# Patient Record
Sex: FEMALE | Race: WHITE | NOT HISPANIC OR LATINO | Employment: UNEMPLOYED | URBAN - NONMETROPOLITAN AREA
[De-identification: names, ages, dates, MRNs, and addresses within clinical notes are randomized per-mention and may not be internally consistent; named-entity substitution may affect disease eponyms.]

---

## 2017-04-04 ENCOUNTER — TRANSCRIBE ORDERS (OUTPATIENT)
Dept: ADMINISTRATIVE | Facility: HOSPITAL | Age: 82
End: 2017-04-04

## 2017-04-04 DIAGNOSIS — Z12.31 VISIT FOR SCREENING MAMMOGRAM: Primary | ICD-10-CM

## 2017-04-11 ENCOUNTER — HOSPITAL ENCOUNTER (OUTPATIENT)
Dept: MAMMOGRAPHY | Facility: HOSPITAL | Age: 82
Discharge: HOME | End: 2017-04-11
Attending: INTERNAL MEDICINE | Admitting: INTERNAL MEDICINE

## 2017-04-11 DIAGNOSIS — Z12.31 VISIT FOR SCREENING MAMMOGRAM: ICD-10-CM

## 2017-04-11 PROCEDURE — 77063 BREAST TOMOSYNTHESIS BI: CPT

## 2017-04-11 PROCEDURE — 77063 BREAST TOMOSYNTHESIS BI: CPT | Performed by: RADIOLOGY

## 2017-04-11 PROCEDURE — G0202 SCR MAMMO BI INCL CAD: HCPCS | Performed by: RADIOLOGY

## 2017-04-11 PROCEDURE — G0202 SCR MAMMO BI INCL CAD: HCPCS

## 2018-03-20 ENCOUNTER — APPOINTMENT (OUTPATIENT)
Dept: CT IMAGING | Facility: HOSPITAL | Age: 83
End: 2018-03-20

## 2018-03-20 ENCOUNTER — HOSPITAL ENCOUNTER (EMERGENCY)
Facility: HOSPITAL | Age: 83
Discharge: HOME | End: 2018-03-20
Attending: EMERGENCY MEDICINE | Admitting: EMERGENCY MEDICINE

## 2018-03-20 ENCOUNTER — APPOINTMENT (OUTPATIENT)
Dept: GENERAL RADIOLOGY | Facility: HOSPITAL | Age: 83
End: 2018-03-20

## 2018-03-20 VITALS
OXYGEN SATURATION: 99 % | BODY MASS INDEX: 26.24 KG/M2 | DIASTOLIC BLOOD PRESSURE: 79 MMHG | RESPIRATION RATE: 18 BRPM | SYSTOLIC BLOOD PRESSURE: 129 MMHG | HEIGHT: 61 IN | WEIGHT: 139 LBS | TEMPERATURE: 98.2 F | HEART RATE: 78 BPM

## 2018-03-20 DIAGNOSIS — S42.224A CLOSED 2-PART NONDISPLACED FRACTURE OF SURGICAL NECK OF RIGHT HUMERUS, INITIAL ENCOUNTER: Primary | ICD-10-CM

## 2018-03-20 DIAGNOSIS — T14.8XXA SKIN ABRASION: ICD-10-CM

## 2018-03-20 PROCEDURE — 99283 EMERGENCY DEPT VISIT LOW MDM: CPT

## 2018-03-20 PROCEDURE — 73030 X-RAY EXAM OF SHOULDER: CPT

## 2018-03-20 PROCEDURE — 73200 CT UPPER EXTREMITY W/O DYE: CPT | Performed by: RADIOLOGY

## 2018-03-20 PROCEDURE — 73200 CT UPPER EXTREMITY W/O DYE: CPT

## 2018-03-20 PROCEDURE — 73030 X-RAY EXAM OF SHOULDER: CPT | Performed by: RADIOLOGY

## 2018-03-20 RX ORDER — HYDROCODONE BITARTRATE AND ACETAMINOPHEN 5; 325 MG/1; MG/1
1 TABLET ORAL ONCE
Status: COMPLETED | OUTPATIENT
Start: 2018-03-20 | End: 2018-03-20

## 2018-03-20 RX ORDER — HYDROCHLOROTHIAZIDE 25 MG/1
25 TABLET ORAL DAILY
COMMUNITY

## 2018-03-20 RX ORDER — LEVOTHYROXINE SODIUM 0.03 MG/1
25 TABLET ORAL DAILY
COMMUNITY

## 2018-03-20 RX ORDER — LISINOPRIL 40 MG/1
40 TABLET ORAL DAILY
COMMUNITY

## 2018-03-20 RX ORDER — ASPIRIN 325 MG
325 TABLET, DELAYED RELEASE (ENTERIC COATED) ORAL EVERY 6 HOURS PRN
COMMUNITY

## 2018-03-20 RX ORDER — BACITRACIN ZINC 500 [USP'U]/G
OINTMENT TOPICAL ONCE
Status: COMPLETED | OUTPATIENT
Start: 2018-03-20 | End: 2018-03-20

## 2018-03-20 RX ORDER — HYDROCODONE BITARTRATE AND ACETAMINOPHEN 7.5; 325 MG/1; MG/1
1 TABLET ORAL EVERY 8 HOURS PRN
Qty: 9 TABLET | Refills: 0 | Status: SHIPPED | OUTPATIENT
Start: 2018-03-20 | End: 2018-07-26

## 2018-03-20 RX ADMIN — BACITRACIN ZINC 1 EACH: 500 OINTMENT TOPICAL at 18:00

## 2018-03-20 RX ADMIN — HYDROCODONE BITARTRATE AND ACETAMINOPHEN 1 TABLET: 5; 325 TABLET ORAL at 22:16

## 2018-03-20 NOTE — ED PROVIDER NOTES
Subjective     History provided by:  Patient   used: No    Fall   Mechanism of injury: fall    Time since incident:  4 hours  Fall:     Fall occurred:  From a ladder    Impact surface:  Hard floor    Point of impact: right shoulder.    Entrapped after fall: no    Suspicion of alcohol use: no    Suspicion of drug use: no    Prior to arrival data:     Patient ambulatory at scene: yes      Responsiveness at scene:  Alert    Orientation at scene:  Person, place, situation and time    Loss of consciousness: no      Amnesic to event: no      Immobilization:  None  Associated symptoms: no abdominal pain, no back pain, no chest pain, no headaches and no neck pain    Risk factors: diabetes    Risk factors: no COPD        Review of Systems   Constitutional: Negative.  Negative for fever.   HENT: Negative.    Respiratory: Negative.    Cardiovascular: Negative.  Negative for chest pain.   Gastrointestinal: Negative.  Negative for abdominal pain.   Endocrine: Negative.    Genitourinary: Negative.  Negative for dysuria.   Musculoskeletal: Negative for back pain and neck pain.        (+) right shoulder pain   Skin: Positive for wound.   Neurological: Negative.  Negative for headaches.   Psychiatric/Behavioral: Negative.    All other systems reviewed and are negative.      Past Medical History:   Diagnosis Date   • Diabetes mellitus    • Disease of thyroid gland    • Hyperlipidemia        Allergies   Allergen Reactions   • Morphine And Related Rash       Past Surgical History:   Procedure Laterality Date   • BREAST BIOPSY Left     benign   • HYSTERECTOMY         Family History   Problem Relation Age of Onset   • Breast cancer Neg Hx        Social History     Social History   • Marital status:      Social History Main Topics   • Smoking status: Never Smoker   • Alcohol use No   • Drug use: No     Other Topics Concern   • Not on file           Objective   Physical Exam   Constitutional: She is oriented to  person, place, and time. She appears well-developed and well-nourished. No distress.   HENT:   Head: Normocephalic and atraumatic.   Right Ear: External ear normal.   Left Ear: External ear normal.   Nose: Nose normal.   Eyes: Conjunctivae and EOM are normal. Pupils are equal, round, and reactive to light.   Neck: Normal range of motion. Neck supple. No JVD present. No tracheal deviation present.   Cardiovascular: Normal rate, regular rhythm and normal heart sounds.    No murmur heard.  Pulmonary/Chest: Effort normal and breath sounds normal. No respiratory distress. She has no wheezes.   Abdominal: Soft. Bowel sounds are normal. There is no tenderness.   Musculoskeletal: She exhibits no edema or deformity.        Right shoulder: She exhibits decreased range of motion, tenderness and pain.   Right shoulder - no active ROM. Can move passively to 90 degrees with pain elicited.   Neurological: She is alert and oriented to person, place, and time. No cranial nerve deficit.   Skin: Skin is warm and dry. Abrasion noted. No rash noted. She is not diaphoretic. No erythema. No pallor.        Psychiatric: She has a normal mood and affect. Her behavior is normal. Thought content normal.   Nursing note and vitals reviewed.      Splint - Cast - Strapping  Date/Time: 3/20/2018 10:08 PM  Performed by: GRISEL GREENBERG  Authorized by: VENKATESH LEDBETTER     Consent:     Consent obtained:  Verbal    Consent given by:  Patient    Risks discussed:  Discoloration, numbness, pain and swelling    Alternatives discussed:  No treatment and delayed treatment  Universal protocol:     Procedure explained and questions answered to patient or proxy's satisfaction: yes      Relevant documents present and verified: yes      Test results available and properly labeled: yes      Imaging studies available: yes      Required blood products, implants, devices, and special equipment available: yes      Site/side marked: yes      Immediately prior  to procedure a time out was called: yes      Patient identity confirmed:  Verbally with patient, arm band, provided demographic data and hospital-assigned identification number  Pre-procedure details:     Sensation:  Normal    Skin color:  Warm, pink  Procedure details:     Laterality:  Right    Location:  Shoulder    Shoulder:  R shoulder    Strapping: no      Supplies:  Sling  Post-procedure details:     Pain:  Unchanged    Sensation:  Normal    Skin color:  Warm, pink    Patient tolerance of procedure:  Tolerated well, no immediate complications  Comments:      Tech applied splint in my presence. Pt tolerated sling application well. No acute complications. Neurovascularly intact.               ED Course  ED Course   Comment By Time   Awaiting CT results. Jorge MELTON called CT and they state report reading still in process. Lilia Brooks PA-C 03/20 2052   Spoke with Dr. Zimmerman recommends sling and ortho follow up in his office. Lilia Brooks PA-C 03/20 2153   Discussed findings and recommendation from Dr. Zimmerman with patient and family. Patient and family is requesting to see Dr. Madison. Will give patient both orthopedics info for follow up as an outpatient. Lilia Brooks PA-C 03/20 2206                  MDM  Number of Diagnoses or Management Options  Closed 2-part nondisplaced fracture of surgical neck of right humerus, initial encounter: new and requires workup  Skin abrasion:      Amount and/or Complexity of Data Reviewed  Tests in the radiology section of CPT®: reviewed and ordered  Discuss the patient with other providers: yes  Independent visualization of images, tracings, or specimens: yes    Risk of Complications, Morbidity, and/or Mortality  Presenting problems: moderate  Diagnostic procedures: moderate  Management options: moderate    Patient Progress  Patient progress: stable      Final diagnoses:   Closed 2-part nondisplaced fracture of surgical neck of right humerus, initial encounter    Skin abrasion            Lilia Brooks PA-C  03/20/18 0208

## 2018-03-20 NOTE — ED NOTES
Pt had unwitnessed fall last night while with her caregiver (sister). Pt was in the floor and couldn't get up for a while. Eventually she got up to her walker and went to bed. She woke this morning in pain, and has hurt very bad all day. It appears she is tender in right neck, Has painful rom with right shoulder, elbow, and also pain in the right hip.     Jorge Douglass RN  03/20/18 2984

## 2018-03-22 ENCOUNTER — OFFICE VISIT (OUTPATIENT)
Dept: ORTHOPEDIC SURGERY | Facility: CLINIC | Age: 83
End: 2018-03-22

## 2018-03-22 VITALS — BODY MASS INDEX: 26.24 KG/M2 | HEIGHT: 61 IN | WEIGHT: 139 LBS

## 2018-03-22 DIAGNOSIS — S42.201A CLOSED FRACTURE OF PROXIMAL END OF RIGHT HUMERUS, UNSPECIFIED FRACTURE MORPHOLOGY, INITIAL ENCOUNTER: Primary | ICD-10-CM

## 2018-03-22 PROCEDURE — 23600 CLTX PROX HUMRL FX W/O MNPJ: CPT | Performed by: ORTHOPAEDIC SURGERY

## 2018-03-22 RX ORDER — ROSUVASTATIN CALCIUM 10 MG/1
10 TABLET, COATED ORAL DAILY
COMMUNITY

## 2018-03-22 RX ORDER — ALLOPURINOL 300 MG/1
300 TABLET ORAL DAILY
COMMUNITY

## 2018-03-22 NOTE — PROGRESS NOTES
"Patient: Raven Ramirez    YOB: 1930        History of Present Illness: Pleasant relatively healthy 87-year-old white female who in Tuesday fell off a step stool injuring her right shoulder.  Complain of some pain is stiffness and bruising.  Denies any paresthesias or swelling of the hand.  Was seen in emergency room and placed in a immobilizer and presents here for follow-up.  Denies any other injury    Past Medical History:   Diagnosis Date   • Diabetes mellitus    • Disease of thyroid gland    • Hyperlipidemia         Social History     Social History   • Marital status:      Spouse name: N/A   • Number of children: N/A   • Years of education: N/A     Occupational History   • Not on file.     Social History Main Topics   • Smoking status: Never Smoker   • Smokeless tobacco: Not on file   • Alcohol use No   • Drug use: No   • Sexual activity: Not on file     Other Topics Concern   • Not on file     Social History Narrative   • No narrative on file           Physical Exam: 87 y.o. female  General Appearance:    Alert and oriented x 3, cooperative, in no acute distress                   Vitals:    03/22/18 1417   Weight: 63 kg (139 lb)   Height: 154.9 cm (61\")          Right shoulder skin is intact.  There is moderate swelling and early bruising and ecchymosis noted in her right upper arm.  No obvious deformities noted.  The right hand grossly neurovascular intact without swelling      Radiology:     X-rays CT scan were done shows what appears to be a 2 part proximal humerus fracture with slight displacement impacted is about 40° of angulation        Assessment/Plan: Right proximal humerus fracture.  Had a long discussion with the patient and the daughter about the options of operative versus nonoperative treatment here.  I told her she could probably go either way.  Our decision was to proceed with nonoperative treatment at this point.  Main risk being some stiffness and decreased function " of the shoulder, not healing etc.  She is to continue the immobilizer at all times.  She can remove it briefly for bathing purposes only.  We'll see her back in approximately 1 week for follow-up x-ray and check.  Discussed the possibility of further movement of the fracture and need for surgery at that point.           Discussed the patient's BMI with her. BMI is within normal parameters. No follow-up required.      Discussion/Summary:        This chart was completed utilizing the dragon speech recognition software.  Grammatical errors, random word insertions, pronoun errors, and incomplete sentences or occasional consequences of the system due to software limitations, ambient noise, and hardware issues.  Any questions or concerns about the content, text, or information contained within the body of this dictation should be directly addressed to the physician for clarification        This document was signed by Ramin Garcia M.D. March 22, 2018 3:02 PM

## 2018-03-28 DIAGNOSIS — S42.294D OTHER CLOSED NONDISPLACED FRACTURE OF PROXIMAL END OF RIGHT HUMERUS WITH ROUTINE HEALING, SUBSEQUENT ENCOUNTER: Primary | ICD-10-CM

## 2018-03-29 ENCOUNTER — OFFICE VISIT (OUTPATIENT)
Dept: ORTHOPEDIC SURGERY | Facility: CLINIC | Age: 83
End: 2018-03-29

## 2018-03-29 ENCOUNTER — HOSPITAL ENCOUNTER (OUTPATIENT)
Dept: GENERAL RADIOLOGY | Facility: HOSPITAL | Age: 83
Discharge: HOME | End: 2018-03-29
Attending: ORTHOPAEDIC SURGERY | Admitting: ORTHOPAEDIC SURGERY

## 2018-03-29 VITALS — HEIGHT: 61 IN | WEIGHT: 138.89 LBS | BODY MASS INDEX: 26.22 KG/M2

## 2018-03-29 DIAGNOSIS — S42.294D OTHER CLOSED NONDISPLACED FRACTURE OF PROXIMAL END OF RIGHT HUMERUS WITH ROUTINE HEALING, SUBSEQUENT ENCOUNTER: Primary | ICD-10-CM

## 2018-03-29 DIAGNOSIS — S42.294D OTHER CLOSED NONDISPLACED FRACTURE OF PROXIMAL END OF RIGHT HUMERUS WITH ROUTINE HEALING, SUBSEQUENT ENCOUNTER: ICD-10-CM

## 2018-03-29 PROBLEM — S42.201D CLOSED FRACTURE OF PROXIMAL END OF RIGHT HUMERUS WITH ROUTINE HEALING: Status: ACTIVE | Noted: 2018-03-22

## 2018-03-29 PROCEDURE — 73030 X-RAY EXAM OF SHOULDER: CPT | Performed by: RADIOLOGY

## 2018-03-29 PROCEDURE — 73030 X-RAY EXAM OF SHOULDER: CPT

## 2018-03-29 PROCEDURE — 99024 POSTOP FOLLOW-UP VISIT: CPT | Performed by: ORTHOPAEDIC SURGERY

## 2018-03-29 NOTE — PROGRESS NOTES
"Patient: Raven Ramirez    YOB: 1930        History of Present Illness: Patient presents for follow-up x-ray of the right shoulder.  She has a comminuted impacted slightly angulated proximal humerus fracture.  No new complaints today.  No specific paresthesias    Past Medical History:   Diagnosis Date   • Diabetes mellitus    • Disease of thyroid gland    • Hyperlipidemia         Social History     Social History   • Marital status:      Spouse name: N/A   • Number of children: N/A   • Years of education: N/A     Occupational History   • Not on file.     Social History Main Topics   • Smoking status: Never Smoker   • Smokeless tobacco: Not on file   • Alcohol use No   • Drug use: No   • Sexual activity: Not on file     Other Topics Concern   • Not on file     Social History Narrative   • No narrative on file           Physical Exam: 87 y.o. female  General Appearance:    Alert and oriented x 3, cooperative, in no acute distress                   Vitals:    03/29/18 1047   Weight: 63 kg (138 lb 14.2 oz)   Height: 154.9 cm (60.98\")          Skin is intact.  There is resolving ecchymosis noted in her arm.  Her right hand is grossly neurovascular intact with minimal swelling      Radiology:   X-rays done today again show a proximal humerus fracture impacted somewhat angulated and comminuted but really no significant change from last week.          Assessment/Plan: Continue the shoulder immobilizer at all times.  She can remove it daily for bathing.  Told her that she can begin pendulum exercises very mildly as long as there is no significant pain or popping or catching.  Have discussed today with the patient again and her son operative versus nonoperative treatment and we have made a decision to treat this nonoperatively.  We'll see her back in approximate 4 weeks for follow-up x-ray and check            Discussion/Summary:        This chart was completed utilizing the dragon speech recognition " software.  Grammatical errors, random word insertions, pronoun errors, and incomplete sentences or occasional consequences of the system due to software limitations, ambient noise, and hardware issues.  Any questions or concerns about the content, text, or information contained within the body of this dictation should be directly addressed to the physician for clarification        This document was signed by Ramin Garcia M.D. March 29, 2018 10:58 AM

## 2018-04-17 ENCOUNTER — OFFICE VISIT (OUTPATIENT)
Dept: ORTHOPEDIC SURGERY | Facility: CLINIC | Age: 83
End: 2018-04-17

## 2018-04-17 ENCOUNTER — HOSPITAL ENCOUNTER (OUTPATIENT)
Dept: GENERAL RADIOLOGY | Facility: HOSPITAL | Age: 83
Discharge: HOME | End: 2018-04-17
Attending: ORTHOPAEDIC SURGERY | Admitting: ORTHOPAEDIC SURGERY

## 2018-04-17 DIAGNOSIS — S42.294D OTHER CLOSED NONDISPLACED FRACTURE OF PROXIMAL END OF RIGHT HUMERUS WITH ROUTINE HEALING, SUBSEQUENT ENCOUNTER: ICD-10-CM

## 2018-04-17 DIAGNOSIS — M25.511 RIGHT SHOULDER PAIN, UNSPECIFIED CHRONICITY: Primary | ICD-10-CM

## 2018-04-17 PROCEDURE — 73030 X-RAY EXAM OF SHOULDER: CPT | Performed by: RADIOLOGY

## 2018-04-17 PROCEDURE — 99024 POSTOP FOLLOW-UP VISIT: CPT | Performed by: ORTHOPAEDIC SURGERY

## 2018-04-17 PROCEDURE — 73030 X-RAY EXAM OF SHOULDER: CPT

## 2018-04-17 NOTE — PROGRESS NOTES
Patient: Raven Ramirez    YOB: 1930        History of Present Illness: Patient presents back today to have her right shoulder checked.  Apparently was trying to do some pendulum exercises and got very painful for a couple days although it is gotten better now.    Past Medical History:   Diagnosis Date   • Diabetes mellitus    • Disease of thyroid gland    • Hyperlipidemia         Social History     Social History   • Marital status:      Spouse name: N/A   • Number of children: N/A   • Years of education: N/A     Occupational History   • Not on file.     Social History Main Topics   • Smoking status: Never Smoker   • Smokeless tobacco: Not on file   • Alcohol use No   • Drug use: No   • Sexual activity: Not on file     Other Topics Concern   • Not on file     Social History Narrative   • No narrative on file           Physical Exam: 87 y.o. female  General Appearance:    Alert and oriented x 3, cooperative, in no acute distress                 There were no vitals filed for this visit.         No obvious deformity or swelling or redness of both shoulders noted.  The right hand grossly neurovascularly intact without swelling.  I can do a gentle pendulum exercises without excruciating discomfort    Radiology:       X-rays done today show no change in alignment of the impacted 4 part proximal humerus fracture.      Assessment/Plan:  Healing right proximal humerus fracture.  Continue sling most of the time.  She can do gentle pendulum exercises to pain tolerance.  Do not let it become very painful.  She can also begin table walking exercises.  We'll see her back in about 2 and half weeks for follow-up x-ray and check may consider starting physical therapy at that point              Discussion/Summary:        This chart was completed utilizing the dragon speech recognition software.  Grammatical errors, random word insertions, pronoun errors, and incomplete sentences or occasional consequences of  the system due to software limitations, ambient noise, and hardware issues.  Any questions or concerns about the content, text, or information contained within the body of this dictation should be directly addressed to the physician for clarification        This document was signed by Ramin Garcia M.D. April 17, 2018 8:57 AM

## 2018-05-02 DIAGNOSIS — S42.291D CLOSED 4-PART FRACTURE OF PROXIMAL END OF RIGHT HUMERUS WITH ROUTINE HEALING, SUBSEQUENT ENCOUNTER: Primary | ICD-10-CM

## 2018-05-03 ENCOUNTER — OFFICE VISIT (OUTPATIENT)
Dept: ORTHOPEDIC SURGERY | Facility: CLINIC | Age: 83
End: 2018-05-03

## 2018-05-03 ENCOUNTER — HOSPITAL ENCOUNTER (OUTPATIENT)
Dept: GENERAL RADIOLOGY | Facility: HOSPITAL | Age: 83
Discharge: HOME | End: 2018-05-03
Attending: ORTHOPAEDIC SURGERY | Admitting: ORTHOPAEDIC SURGERY

## 2018-05-03 VITALS — HEIGHT: 61 IN | BODY MASS INDEX: 26.43 KG/M2 | WEIGHT: 140 LBS

## 2018-05-03 DIAGNOSIS — S42.291D CLOSED 4-PART FRACTURE OF PROXIMAL END OF RIGHT HUMERUS WITH ROUTINE HEALING, SUBSEQUENT ENCOUNTER: ICD-10-CM

## 2018-05-03 DIAGNOSIS — S42.291D CLOSED 4-PART FRACTURE OF PROXIMAL END OF RIGHT HUMERUS WITH ROUTINE HEALING, SUBSEQUENT ENCOUNTER: Primary | ICD-10-CM

## 2018-05-03 PROBLEM — S42.293D: Status: ACTIVE | Noted: 2018-03-22

## 2018-05-03 PROCEDURE — 99024 POSTOP FOLLOW-UP VISIT: CPT | Performed by: ORTHOPAEDIC SURGERY

## 2018-05-03 PROCEDURE — 73030 X-RAY EXAM OF SHOULDER: CPT

## 2018-05-03 PROCEDURE — 73030 X-RAY EXAM OF SHOULDER: CPT | Performed by: RADIOLOGY

## 2018-06-13 DIAGNOSIS — M25.511 RIGHT SHOULDER PAIN, UNSPECIFIED CHRONICITY: Primary | ICD-10-CM

## 2018-06-14 ENCOUNTER — HOSPITAL ENCOUNTER (OUTPATIENT)
Dept: GENERAL RADIOLOGY | Facility: HOSPITAL | Age: 83
Discharge: HOME | End: 2018-06-14
Attending: ORTHOPAEDIC SURGERY | Admitting: ORTHOPAEDIC SURGERY

## 2018-06-14 ENCOUNTER — OFFICE VISIT (OUTPATIENT)
Dept: ORTHOPEDIC SURGERY | Facility: CLINIC | Age: 83
End: 2018-06-14

## 2018-06-14 VITALS — HEIGHT: 61 IN | BODY MASS INDEX: 26.43 KG/M2 | WEIGHT: 140 LBS

## 2018-06-14 DIAGNOSIS — M25.511 RIGHT SHOULDER PAIN, UNSPECIFIED CHRONICITY: ICD-10-CM

## 2018-06-14 DIAGNOSIS — S42.291D CLOSED 4-PART FRACTURE OF PROXIMAL END OF RIGHT HUMERUS WITH ROUTINE HEALING, SUBSEQUENT ENCOUNTER: Primary | ICD-10-CM

## 2018-06-14 PROCEDURE — 73030 X-RAY EXAM OF SHOULDER: CPT | Performed by: RADIOLOGY

## 2018-06-14 PROCEDURE — 99024 POSTOP FOLLOW-UP VISIT: CPT | Performed by: ORTHOPAEDIC SURGERY

## 2018-06-14 PROCEDURE — 73030 X-RAY EXAM OF SHOULDER: CPT

## 2018-06-14 NOTE — PROGRESS NOTES
"Patient: Raven Ramirez    YOB: 1930        History of Present Illness: Patient is proximally 1011 weeks status post right proximal humerus fracture comminuted with some displacement of the humeral head.  We chose to treat this nonoperatively and she's doing fine without major complaints today.  Feels that therapy is really helping her.  Denies any swelling or paresthesias of the right arm    Past Medical History:   Diagnosis Date   • Diabetes mellitus    • Disease of thyroid gland    • Hyperlipidemia         Social History     Social History   • Marital status:      Spouse name: N/A   • Number of children: N/A   • Years of education: N/A     Occupational History   • Not on file.     Social History Main Topics   • Smoking status: Never Smoker   • Smokeless tobacco: Not on file   • Alcohol use No   • Drug use: No   • Sexual activity: Not on file     Other Topics Concern   • Not on file     Social History Narrative   • No narrative on file           Physical Exam: 87 y.o. female  General Appearance:    Alert and oriented x 3, cooperative, in no acute distress                   Vitals:    06/14/18 1003   Weight: 63.5 kg (140 lb)   Height: 154.9 cm (61\")          On exam she has no obvious deformity or swelling or warmth the shoulder.  She can easily get her hand to her mouth, top of her head, and to her backside      Radiology:       X-rays show no change in alignment of the fracture some callus is noted      Assessment/Plan: Healing right proximal humerus fracture.  Patient clinically is doing quite well.  Would like to continue physical therapy for another month and she will be a little bit more motion as far as forward flexion and abduction.  We'll see her back for final visit in 6 weeks for x-ray and check          Discussion/Summary:                This chart was completed utilizing the dragon speech recognition software.  Grammatical errors, random word insertions, pronoun errors, and " incomplete sentences or occasional consequences of the system due to software limitations, ambient noise, and hardware issues.  Any questions or concerns about the content, text, or information contained within the body of this dictation should be directly addressed to the physician for clarification        This document was signed by Ramin Garcia M.D. June 14, 2018 10:18 AM

## 2018-07-25 DIAGNOSIS — S42.291D OTHER CLOSED DISPLACED FRACTURE OF PROXIMAL END OF RIGHT HUMERUS WITH ROUTINE HEALING, SUBSEQUENT ENCOUNTER: Primary | ICD-10-CM

## 2018-07-26 ENCOUNTER — HOSPITAL ENCOUNTER (OUTPATIENT)
Dept: GENERAL RADIOLOGY | Facility: HOSPITAL | Age: 83
Discharge: HOME | End: 2018-07-26
Attending: ORTHOPAEDIC SURGERY | Admitting: ORTHOPAEDIC SURGERY

## 2018-07-26 ENCOUNTER — OFFICE VISIT (OUTPATIENT)
Dept: ORTHOPEDIC SURGERY | Facility: CLINIC | Age: 83
End: 2018-07-26

## 2018-07-26 VITALS — WEIGHT: 140 LBS | BODY MASS INDEX: 26.43 KG/M2 | HEIGHT: 61 IN

## 2018-07-26 DIAGNOSIS — S42.291D OTHER CLOSED DISPLACED FRACTURE OF PROXIMAL END OF RIGHT HUMERUS WITH ROUTINE HEALING, SUBSEQUENT ENCOUNTER: Primary | ICD-10-CM

## 2018-07-26 DIAGNOSIS — S42.291D OTHER CLOSED DISPLACED FRACTURE OF PROXIMAL END OF RIGHT HUMERUS WITH ROUTINE HEALING, SUBSEQUENT ENCOUNTER: ICD-10-CM

## 2018-07-26 PROCEDURE — 73030 X-RAY EXAM OF SHOULDER: CPT

## 2018-07-26 PROCEDURE — 73030 X-RAY EXAM OF SHOULDER: CPT | Performed by: RADIOLOGY

## 2018-07-26 PROCEDURE — 99213 OFFICE O/P EST LOW 20 MIN: CPT | Performed by: ORTHOPAEDIC SURGERY

## 2018-07-26 RX ORDER — CHLORAL HYDRATE 500 MG
CAPSULE ORAL
COMMUNITY

## 2018-07-26 NOTE — PROGRESS NOTES
"Patient: Raven Ramirez    YOB: 1930        History of Present Illness: Patient is approximately 4 and half months status post a comminuted posterior displaced proximal humerus fracture which we treated nonoperatively.  Presents back today with no specific complaints.  States she has more range of motion of her shoulder.  No significant pain paresthesias or swelling    Past Medical History:   Diagnosis Date   • Diabetes mellitus (CMS/HCC)    • Disease of thyroid gland    • Hyperlipidemia    • Proximal humerus fracture         Social History     Social History   • Marital status:      Spouse name: N/A   • Number of children: N/A   • Years of education: N/A     Occupational History   • Not on file.     Social History Main Topics   • Smoking status: Never Smoker   • Smokeless tobacco: Never Used   • Alcohol use No   • Drug use: No   • Sexual activity: Not on file     Other Topics Concern   • Not on file     Social History Narrative   • No narrative on file           Physical Exam: 88 y.o. female  General Appearance:    Alert and oriented x 3, cooperative, in no acute distress                   Vitals:    07/26/18 1005   Weight: 63.5 kg (140 lb)   Height: 154.9 cm (60.98\")          Exam shows her skin is intact.  She has about 25° of external rotation or side compared about 45° on the left.  She can forward flex to about 110°.  She can abduct to about 100.  She can easily get her hand to her mouth now into the top of her head.  She can also get her backside.  Right hand grossly neurovascular intact without swelling      Radiology:       X-rays do shows no change in alignment of the fracture callus and healing is noted      Assessment/Plan: Healing proximal humerus fracture.  Clinically patient is doing pretty well.  Again we discussed this never be perfect she's always have some residual stiffness but I do expect some continued improvement slowly over the next 6 months.  I have discharged her " from the office today she's doing well enough.  There is any problems she'll give us a call      I      Discussion/Summary:                This chart was completed utilizing the dragon speech recognition software.  Grammatical errors, random word insertions, pronoun errors, and incomplete sentences or occasional consequences of the system due to software limitations, ambient noise, and hardware issues.  Any questions or concerns about the content, text, or information contained within the body of this dictation should be directly addressed to the physician for clarification        This document was signed by Ramin Garcia M.D. July 26, 2018 10:22 AM

## 2019-04-04 ENCOUNTER — APPOINTMENT (OUTPATIENT)
Dept: CT IMAGING | Facility: HOSPITAL | Age: 84
End: 2019-04-04

## 2019-04-04 ENCOUNTER — HOSPITAL ENCOUNTER (EMERGENCY)
Facility: HOSPITAL | Age: 84
Discharge: HOME | End: 2019-04-04
Attending: EMERGENCY MEDICINE | Admitting: EMERGENCY MEDICINE

## 2019-04-04 VITALS
BODY MASS INDEX: 26.06 KG/M2 | OXYGEN SATURATION: 99 % | SYSTOLIC BLOOD PRESSURE: 172 MMHG | TEMPERATURE: 98.6 F | RESPIRATION RATE: 20 BRPM | DIASTOLIC BLOOD PRESSURE: 91 MMHG | HEIGHT: 61 IN | WEIGHT: 138 LBS | HEART RATE: 80 BPM

## 2019-04-04 DIAGNOSIS — S16.1XXA STRAIN OF NECK MUSCLE, INITIAL ENCOUNTER: ICD-10-CM

## 2019-04-04 DIAGNOSIS — S00.83XA CONTUSION OF FACE, INITIAL ENCOUNTER: Primary | ICD-10-CM

## 2019-04-04 PROCEDURE — 70450 CT HEAD/BRAIN W/O DYE: CPT | Performed by: RADIOLOGY

## 2019-04-04 PROCEDURE — 70486 CT MAXILLOFACIAL W/O DYE: CPT | Performed by: RADIOLOGY

## 2019-04-04 PROCEDURE — 70450 CT HEAD/BRAIN W/O DYE: CPT

## 2019-04-04 PROCEDURE — 99283 EMERGENCY DEPT VISIT LOW MDM: CPT

## 2019-04-04 PROCEDURE — 72125 CT NECK SPINE W/O DYE: CPT | Performed by: RADIOLOGY

## 2019-04-04 PROCEDURE — 70486 CT MAXILLOFACIAL W/O DYE: CPT

## 2019-04-04 PROCEDURE — 72125 CT NECK SPINE W/O DYE: CPT

## 2019-04-04 NOTE — ED PROVIDER NOTES
Subjective     History provided by:  Patient  Fall   Mechanism of injury: fall    Injury location:  Head/neck and face  Head/neck injury location:  R neck  Facial injury location:  Forehead  Incident location:  Home  Time since incident:  1 day  Arrived directly from scene: no    Fall:     Fall occurred:  Tripped    Impact surface:  Hard floor    Point of impact:  Face    Entrapped after fall: no    Protective equipment: none    Suspicion of alcohol use: no    Suspicion of drug use: no    Tetanus status:  Up to date  Prior to arrival data:     Bystander interventions:  None  Associated symptoms: no back pain, no blindness, no chest pain, no difficulty breathing, no headaches, no hearing loss, no nausea, no neck pain and no seizures    Risk factors: diabetes        Review of Systems   Constitutional: Negative.    HENT: Negative.  Negative for hearing loss.    Eyes: Negative.  Negative for blindness.   Respiratory: Negative.    Cardiovascular: Negative.  Negative for chest pain.   Gastrointestinal: Negative.  Negative for nausea.   Endocrine: Negative.    Genitourinary: Negative.    Musculoskeletal: Negative.  Negative for back pain and neck pain.   Skin: Negative.    Allergic/Immunologic: Negative.    Neurological: Negative.  Negative for seizures and headaches.   Hematological: Negative.    Psychiatric/Behavioral: Negative.        Past Medical History:   Diagnosis Date   • Diabetes mellitus (CMS/HCC)    • Disease of thyroid gland    • Hyperlipidemia    • Proximal humerus fracture        Allergies   Allergen Reactions   • Morphine And Related Rash       Past Surgical History:   Procedure Laterality Date   • APPENDECTOMY     • BREAST BIOPSY Left     benign   • CARDIAC SURGERY     • GALLBLADDER SURGERY     • HYSTERECTOMY     • HYSTERECTOMY         Family History   Problem Relation Age of Onset   • Rheum arthritis Sister    • Heart disease Sister    • Diabetes Sister    • Breast cancer Neg Hx        Social History      Socioeconomic History   • Marital status:      Spouse name: Not on file   • Number of children: Not on file   • Years of education: Not on file   • Highest education level: Not on file   Tobacco Use   • Smoking status: Never Smoker   • Smokeless tobacco: Never Used   Substance and Sexual Activity   • Alcohol use: No   • Drug use: No           Objective   Physical Exam   Constitutional: She is oriented to person, place, and time. She appears well-developed and well-nourished.   HENT:   Head: Normocephalic.   Right Ear: External ear normal.   Left Ear: External ear normal.   Mouth/Throat: Oropharynx is clear and moist.   Eyes: Conjunctivae and EOM are normal. Pupils are equal, round, and reactive to light.   Neck: Normal range of motion. Neck supple.   Cardiovascular: Normal rate, regular rhythm, normal heart sounds and intact distal pulses.   Pulmonary/Chest: Effort normal and breath sounds normal.   Abdominal: Soft. Bowel sounds are normal.   Musculoskeletal: Normal range of motion.        Cervical back: She exhibits tenderness and pain.   Neurological: She is alert and oriented to person, place, and time.   Skin: Skin is warm and dry. Capillary refill takes less than 2 seconds.   Psychiatric: She has a normal mood and affect. Her behavior is normal. Thought content normal.   Nursing note and vitals reviewed.      Procedures           ED Course                  MDM      Final diagnoses:   Contusion of face, initial encounter   Strain of neck muscle, initial encounter            Kishan Payan, APRN  04/04/19 1030

## 2019-04-05 ENCOUNTER — PATIENT OUTREACH (OUTPATIENT)
Dept: CASE MANAGEMENT | Facility: OTHER | Age: 84
End: 2019-04-05

## 2020-10-27 NOTE — PROGRESS NOTES
"Formerly [facility], later [facility] Progress Note  Patient Name: Blaire Cage MRN: [id number]   Patient Class: OP- Observation  Length of Stay: 0   Admission Date: 10/25/2020  7:38 PM Attending Physician: Jenaro Graff MD   Primary Care Provider: Eli Edmonds MD Face-to-Face encounter date: 10/27/2020   Chief Complaint: Nausea (last meal at 1200. ), Vomiting, Diarrhea, and Weakness (Pt went to restroom after beginning of incontinence and lowered herself to the floor. Family found her and cleaned her up prior to calling EMS)    Assessment & Plan:   Blaire Cage is a 89 y.o. 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     Subjective:    Interval History   Patient is doing fairly well.    Denies chest pain, shortness of breath, palpitations, abdominal pain, nausea/vomiting.   No concerns/issues overnight reported by the patient or the nursing staff.  Reviewed the labs and discussed the plan of care.   No family present at bedside.     Review of Systems   All other Review of Systems were found to be negative expect for that mentioned already in HPI.   Objective:   Physical Exam  BP (!) 164/77   Pulse 78   Temp 98.7 °F (37.1 °C)   Resp 18   Ht 5' 6" (1.676 m)   Wt 54.4 kg (119 lb 14.9 oz)   LMP  (LMP Unknown)   SpO2 96%   Breastfeeding No   BMI 19.36 kg/m²   Vitals reviewed.    Constitutional: No distress.   HENT: Atraumatic.   Cardiovascular: Normal rate, regular rhythm and normal heart sounds.   Pulmonary/Chest: Effort normal. Clear to auscultation bilaterally. No wheezes.   Abdominal: Soft. Bowel sounds are normal. Exhibits no distension and no mass. No tenderness  Neurological: Alert.   Skin: Skin is warm and dry.     Following labs were Reviewed " "Patient: Raven Ramirez    YOB: 1930        History of Present Illness: 87-year-old white female presents for follow-up of her right proximal humerus fracture.  States she's doing better.  No major complaints today no paresthesias    Past Medical History:   Diagnosis Date   • Diabetes mellitus    • Disease of thyroid gland    • Hyperlipidemia         Social History     Social History   • Marital status:      Spouse name: N/A   • Number of children: N/A   • Years of education: N/A     Occupational History   • Not on file.     Social History Main Topics   • Smoking status: Never Smoker   • Smokeless tobacco: Not on file   • Alcohol use No   • Drug use: No   • Sexual activity: Not on file     Other Topics Concern   • Not on file     Social History Narrative   • No narrative on file           Physical Exam: 87 y.o. female  General Appearance:    Alert and oriented x 3, cooperative, in no acute distress                   Vitals:    05/03/18 0955   Weight: 63.5 kg (140 lb)   Height: 154.9 cm (61\")          Some resolving ecchymosis is noted in her arm.  Right hand grossly neurovascular intact.  Has about 40° of external rotation at her side      Radiology:   X-rays show no change in alignment of the fracture.  Some early callus is noted          Assessment/Plan: Healing right proximal humerus fracture.  At this point were start her weaning from her sling as comfort permits.  She can begin try knees her arm for activities of daily living but no heavy lifting pushing or pulling.  Have also written to get her started with physical therapy nice and gentle.  We'll see her back in 6 weeks to see how she is doing              Patient's Body mass index is 26.45 kg/m². BMI is within normal parameters. No follow-up required.      Discussion/Summary:                This chart was completed utilizing the dragon speech recognition software.  Grammatical errors, random word insertions, pronoun errors, and "   Recent Labs   Lab 10/27/20  0544   WBC 9.42   HGB 8.0*   HCT 24.8*   *   CALCIUM 8.2*      K 3.9   CO2 23      BUN 59*   CREATININE 2.0*     No results found for: POCTGLUCOSE     All labs within the past 24 hours have been reviewed  Microbiology Results (last 7 days)     Procedure Component Value Units Date/Time    Blood culture #2 **CANNOT BE ORDERED STAT** [[id number]] Collected: 10/25/20 2126    Order Status: Completed Specimen: Blood from Peripheral, Antecubital, Right Updated: 10/26/20 2232     Blood Culture, Routine No Growth to date      No Growth to date    Blood culture #1 **CANNOT BE ORDERED STAT** [[id number]] Collected: 10/25/20 2015    Order Status: Completed Specimen: Blood from Peripheral, Antecubital, Right Updated: 10/26/20 2232     Blood Culture, Routine No Growth to date      No Growth to date    Clostridium difficile EIA [[id number]]     Order Status: Canceled Specimen: Stool         CT Renal Stone Study ABD Pelvis WO   Final Result      CT Cervical Spine Without Contrast   Final Result      CT Head Without Contrast   Final Result      X-Ray Chest AP Portable   Final Result          Inpatient medications  Scheduled Meds:   allopurinoL  100 mg Oral Daily    amLODIPine  5 mg Oral BID    ferrous sulfate  325 mg Oral BID WM    fluticasone propionate  2 spray Each Nostril Daily    fluticasone-umeclidin-vilanter  1 puff Inhalation Daily    gabapentin  300 mg Oral QHS    levothyroxine  88 mcg Oral Before breakfast    magnesium oxide  400 mg Oral Daily    metronidazole  500 mg Intravenous Q8H    montelukast  10 mg Oral QHS    multivitamin  1 tablet Oral Daily    sertraline  25 mg Oral Daily    simvastatin  40 mg Oral QHS     Continuous Infusions:   pantoprozole (PROTONIX) IV infusion 8 mg/hr (10/27/20 0300)     PRN Meds:.acetaminophen, albuterol, melatonin, ondansetron, polyethylene glycol, sodium chloride 0.9%    Above encounter included review of the medical records,  incomplete sentences or occasional consequences of the system due to software limitations, ambient noise, and hardware issues.  Any questions or concerns about the content, text, or information contained within the body of this dictation should be directly addressed to the physician for clarification        This document was signed by Ramin Garcia M.D. May 3, 2018 10:09 AM   interviewing and examining the patient face-to-face, discussion with family and other health care providers, ordering and interpreting lab/test results and formulating a plan of care.     Medical Decision Making:    [] Low Complexity  [x] Moderate Complexity  [] High Complexity    Jenaro Graff  [facility] Hospitalist  10/27/2020

## 2021-01-26 ENCOUNTER — IMMUNIZATION (OUTPATIENT)
Dept: VACCINE CLINIC | Facility: HOSPITAL | Age: 86
End: 2021-01-26

## 2021-01-26 PROCEDURE — 91300 HC SARSCOV02 VAC 30MCG/0.3ML IM: CPT | Performed by: FAMILY MEDICINE

## 2021-01-26 PROCEDURE — 0001A: CPT | Performed by: FAMILY MEDICINE

## 2021-01-27 ENCOUNTER — APPOINTMENT (OUTPATIENT)
Dept: VACCINE CLINIC | Facility: HOSPITAL | Age: 86
End: 2021-01-27

## 2021-02-16 ENCOUNTER — IMMUNIZATION (OUTPATIENT)
Dept: VACCINE CLINIC | Facility: HOSPITAL | Age: 86
End: 2021-02-16

## 2021-02-16 PROCEDURE — 91300 HC SARSCOV02 VAC 30MCG/0.3ML IM: CPT | Performed by: INTERNAL MEDICINE

## 2021-02-16 PROCEDURE — 0002A: CPT | Performed by: INTERNAL MEDICINE

## 2021-09-28 ENCOUNTER — IMMUNIZATION (OUTPATIENT)
Dept: VACCINE CLINIC | Facility: HOSPITAL | Age: 86
End: 2021-09-28

## 2021-09-28 PROCEDURE — 91300 HC SARSCOV02 VAC 30MCG/0.3ML IM: CPT | Performed by: INTERNAL MEDICINE

## 2021-09-28 PROCEDURE — 0003A: CPT | Performed by: INTERNAL MEDICINE

## 2023-07-14 ENCOUNTER — APPOINTMENT (OUTPATIENT)
Dept: GENERAL RADIOLOGY | Facility: HOSPITAL | Age: 88
End: 2023-07-14
Payer: MEDICARE

## 2023-07-14 ENCOUNTER — APPOINTMENT (OUTPATIENT)
Dept: CT IMAGING | Facility: HOSPITAL | Age: 88
End: 2023-07-14
Payer: MEDICARE

## 2023-07-14 ENCOUNTER — HOSPITAL ENCOUNTER (OUTPATIENT)
Facility: HOSPITAL | Age: 88
Setting detail: OBSERVATION
Discharge: HOME HEALTH | End: 2023-07-15
Attending: EMERGENCY MEDICINE | Admitting: STUDENT IN AN ORGANIZED HEALTH CARE EDUCATION/TRAINING PROGRAM
Payer: MEDICARE

## 2023-07-14 DIAGNOSIS — I63.9 ISCHEMIC STROKE: ICD-10-CM

## 2023-07-14 DIAGNOSIS — R47.1 DYSARTHRIA: Primary | ICD-10-CM

## 2023-07-14 LAB
ABO GROUP BLD: NORMAL
ABO GROUP BLD: NORMAL
ALBUMIN SERPL-MCNC: 4.5 G/DL (ref 3.5–5.2)
ALBUMIN/GLOB SERPL: 1.8 G/DL
ALP SERPL-CCNC: 76 U/L (ref 39–117)
ALT SERPL W P-5'-P-CCNC: 17 U/L (ref 1–33)
ANION GAP SERPL CALCULATED.3IONS-SCNC: 13.1 MMOL/L (ref 5–15)
APTT PPP: 26.5 SECONDS (ref 26.5–34.5)
AST SERPL-CCNC: 26 U/L (ref 1–32)
BASOPHILS # BLD AUTO: 0.04 10*3/MM3 (ref 0–0.2)
BASOPHILS NFR BLD AUTO: 0.5 % (ref 0–1.5)
BILIRUB SERPL-MCNC: 0.3 MG/DL (ref 0–1.2)
BLD GP AB SCN SERPL QL: NEGATIVE
BUN SERPL-MCNC: 17 MG/DL (ref 8–23)
BUN/CREAT SERPL: 14.5 (ref 7–25)
CALCIUM SPEC-SCNC: 10.1 MG/DL (ref 8.2–9.6)
CHLORIDE SERPL-SCNC: 104 MMOL/L (ref 98–107)
CO2 SERPL-SCNC: 21.9 MMOL/L (ref 22–29)
CREAT BLDA-MCNC: 1.1 MG/DL (ref 0.6–1.3)
CREAT SERPL-MCNC: 1.17 MG/DL (ref 0.57–1)
DEPRECATED RDW RBC AUTO: 47.8 FL (ref 37–54)
EGFRCR SERPLBLD CKD-EPI 2021: 43.6 ML/MIN/1.73
EOSINOPHIL # BLD AUTO: 0.64 10*3/MM3 (ref 0–0.4)
EOSINOPHIL NFR BLD AUTO: 7.7 % (ref 0.3–6.2)
ERYTHROCYTE [DISTWIDTH] IN BLOOD BY AUTOMATED COUNT: 13.8 % (ref 12.3–15.4)
FLUAV RNA RESP QL NAA+PROBE: NOT DETECTED
FLUBV RNA ISLT QL NAA+PROBE: NOT DETECTED
GLOBULIN UR ELPH-MCNC: 2.5 GM/DL
GLUCOSE BLDC GLUCOMTR-MCNC: 168 MG/DL (ref 70–130)
GLUCOSE BLDC GLUCOMTR-MCNC: 271 MG/DL (ref 70–130)
GLUCOSE SERPL-MCNC: 282 MG/DL (ref 65–99)
HCT VFR BLD AUTO: 37.6 % (ref 34–46.6)
HGB BLD-MCNC: 12.6 G/DL (ref 12–15.9)
HOLD SPECIMEN: NORMAL
HOLD SPECIMEN: NORMAL
IMM GRANULOCYTES # BLD AUTO: 0.02 10*3/MM3 (ref 0–0.05)
IMM GRANULOCYTES NFR BLD AUTO: 0.2 % (ref 0–0.5)
INR PPP: 0.89 (ref 0.9–1.1)
LYMPHOCYTES # BLD AUTO: 1.87 10*3/MM3 (ref 0.7–3.1)
LYMPHOCYTES NFR BLD AUTO: 22.6 % (ref 19.6–45.3)
MAGNESIUM SERPL-MCNC: 1.4 MG/DL (ref 1.7–2.3)
MCH RBC QN AUTO: 31.6 PG (ref 26.6–33)
MCHC RBC AUTO-ENTMCNC: 33.5 G/DL (ref 31.5–35.7)
MCV RBC AUTO: 94.2 FL (ref 79–97)
MONOCYTES # BLD AUTO: 0.38 10*3/MM3 (ref 0.1–0.9)
MONOCYTES NFR BLD AUTO: 4.6 % (ref 5–12)
NEUTROPHILS NFR BLD AUTO: 5.33 10*3/MM3 (ref 1.7–7)
NEUTROPHILS NFR BLD AUTO: 64.4 % (ref 42.7–76)
NRBC BLD AUTO-RTO: 0 /100 WBC (ref 0–0.2)
PLATELET # BLD AUTO: 142 10*3/MM3 (ref 140–450)
PMV BLD AUTO: 10.5 FL (ref 6–12)
POTASSIUM SERPL-SCNC: 4.3 MMOL/L (ref 3.5–5.2)
PROT SERPL-MCNC: 7 G/DL (ref 6–8.5)
PROTHROMBIN TIME: 12.6 SECONDS (ref 12.1–14.7)
QT INTERVAL: 332 MS
QTC INTERVAL: 432 MS
RBC # BLD AUTO: 3.99 10*6/MM3 (ref 3.77–5.28)
RH BLD: POSITIVE
RH BLD: POSITIVE
SARS-COV-2 RNA RESP QL NAA+PROBE: NOT DETECTED
SODIUM SERPL-SCNC: 139 MMOL/L (ref 136–145)
T&S EXPIRATION DATE: NORMAL
TROPONIN T SERPL HS-MCNC: 11 NG/L
TSH SERPL DL<=0.05 MIU/L-ACNC: 4.91 UIU/ML (ref 0.27–4.2)
WBC NRBC COR # BLD: 8.28 10*3/MM3 (ref 3.4–10.8)
WHOLE BLOOD HOLD COAG: NORMAL
WHOLE BLOOD HOLD SPECIMEN: NORMAL

## 2023-07-14 PROCEDURE — 0042T CT CEREBRAL PERFUSION W WO CONTRAST: CPT | Performed by: RADIOLOGY

## 2023-07-14 PROCEDURE — 86901 BLOOD TYPING SEROLOGIC RH(D): CPT

## 2023-07-14 PROCEDURE — 70498 CT ANGIOGRAPHY NECK: CPT

## 2023-07-14 PROCEDURE — 85730 THROMBOPLASTIN TIME PARTIAL: CPT | Performed by: EMERGENCY MEDICINE

## 2023-07-14 PROCEDURE — 86901 BLOOD TYPING SEROLOGIC RH(D): CPT | Performed by: EMERGENCY MEDICINE

## 2023-07-14 PROCEDURE — G0378 HOSPITAL OBSERVATION PER HR: HCPCS

## 2023-07-14 PROCEDURE — 0042T HC CT CEREBRAL PERFUSION W/WO CONTRAST: CPT

## 2023-07-14 PROCEDURE — 93010 ELECTROCARDIOGRAM REPORT: CPT | Performed by: INTERNAL MEDICINE

## 2023-07-14 PROCEDURE — 85610 PROTHROMBIN TIME: CPT | Performed by: EMERGENCY MEDICINE

## 2023-07-14 PROCEDURE — 84484 ASSAY OF TROPONIN QUANT: CPT | Performed by: EMERGENCY MEDICINE

## 2023-07-14 PROCEDURE — 93005 ELECTROCARDIOGRAM TRACING: CPT | Performed by: EMERGENCY MEDICINE

## 2023-07-14 PROCEDURE — 70496 CT ANGIOGRAPHY HEAD: CPT | Performed by: RADIOLOGY

## 2023-07-14 PROCEDURE — 25510000001 IOPAMIDOL PER 1 ML: Performed by: EMERGENCY MEDICINE

## 2023-07-14 PROCEDURE — 82948 REAGENT STRIP/BLOOD GLUCOSE: CPT

## 2023-07-14 PROCEDURE — 80053 COMPREHEN METABOLIC PANEL: CPT | Performed by: EMERGENCY MEDICINE

## 2023-07-14 PROCEDURE — 84443 ASSAY THYROID STIM HORMONE: CPT | Performed by: EMERGENCY MEDICINE

## 2023-07-14 PROCEDURE — 86900 BLOOD TYPING SEROLOGIC ABO: CPT | Performed by: EMERGENCY MEDICINE

## 2023-07-14 PROCEDURE — 82565 ASSAY OF CREATININE: CPT

## 2023-07-14 PROCEDURE — 63710000001 INSULIN LISPRO (HUMAN) PER 5 UNITS: Performed by: STUDENT IN AN ORGANIZED HEALTH CARE EDUCATION/TRAINING PROGRAM

## 2023-07-14 PROCEDURE — 85025 COMPLETE CBC W/AUTO DIFF WBC: CPT | Performed by: EMERGENCY MEDICINE

## 2023-07-14 PROCEDURE — 86900 BLOOD TYPING SEROLOGIC ABO: CPT

## 2023-07-14 PROCEDURE — 87636 SARSCOV2 & INF A&B AMP PRB: CPT | Performed by: EMERGENCY MEDICINE

## 2023-07-14 PROCEDURE — 83735 ASSAY OF MAGNESIUM: CPT | Performed by: EMERGENCY MEDICINE

## 2023-07-14 PROCEDURE — 99223 1ST HOSP IP/OBS HIGH 75: CPT

## 2023-07-14 PROCEDURE — 70496 CT ANGIOGRAPHY HEAD: CPT

## 2023-07-14 PROCEDURE — 71045 X-RAY EXAM CHEST 1 VIEW: CPT

## 2023-07-14 PROCEDURE — 99285 EMERGENCY DEPT VISIT HI MDM: CPT

## 2023-07-14 PROCEDURE — 70498 CT ANGIOGRAPHY NECK: CPT | Performed by: RADIOLOGY

## 2023-07-14 PROCEDURE — 71045 X-RAY EXAM CHEST 1 VIEW: CPT | Performed by: RADIOLOGY

## 2023-07-14 PROCEDURE — 70450 CT HEAD/BRAIN W/O DYE: CPT

## 2023-07-14 PROCEDURE — 86850 RBC ANTIBODY SCREEN: CPT | Performed by: EMERGENCY MEDICINE

## 2023-07-14 RX ORDER — SODIUM CHLORIDE 0.9 % (FLUSH) 0.9 %
10 SYRINGE (ML) INJECTION AS NEEDED
Status: DISCONTINUED | OUTPATIENT
Start: 2023-07-14 | End: 2023-07-15 | Stop reason: HOSPADM

## 2023-07-14 RX ORDER — ASPIRIN 81 MG/1
324 TABLET, CHEWABLE ORAL ONCE
Status: DISCONTINUED | OUTPATIENT
Start: 2023-07-14 | End: 2023-07-14

## 2023-07-14 RX ORDER — ASPIRIN 325 MG
325 TABLET, DELAYED RELEASE (ENTERIC COATED) ORAL DAILY
Status: DISCONTINUED | OUTPATIENT
Start: 2023-07-14 | End: 2023-07-15 | Stop reason: HOSPADM

## 2023-07-14 RX ORDER — ROSUVASTATIN CALCIUM 10 MG/1
10 TABLET, COATED ORAL NIGHTLY
Status: CANCELLED | OUTPATIENT
Start: 2023-07-14

## 2023-07-14 RX ORDER — ATORVASTATIN CALCIUM 40 MG/1
80 TABLET, FILM COATED ORAL NIGHTLY
Status: DISCONTINUED | OUTPATIENT
Start: 2023-07-14 | End: 2023-07-14

## 2023-07-14 RX ORDER — ASPIRIN 325 MG
325 TABLET, DELAYED RELEASE (ENTERIC COATED) ORAL DAILY
Status: CANCELLED | OUTPATIENT
Start: 2023-07-15

## 2023-07-14 RX ORDER — SODIUM CHLORIDE 0.9 % (FLUSH) 0.9 %
10 SYRINGE (ML) INJECTION EVERY 12 HOURS SCHEDULED
Status: DISCONTINUED | OUTPATIENT
Start: 2023-07-14 | End: 2023-07-15 | Stop reason: HOSPADM

## 2023-07-14 RX ORDER — DEXTROSE MONOHYDRATE 25 G/50ML
25 INJECTION, SOLUTION INTRAVENOUS
Status: DISCONTINUED | OUTPATIENT
Start: 2023-07-14 | End: 2023-07-15 | Stop reason: HOSPADM

## 2023-07-14 RX ORDER — LEVOTHYROXINE SODIUM 0.05 MG/1
50 TABLET ORAL DAILY
COMMUNITY

## 2023-07-14 RX ORDER — GLUCAGON 1 MG/ML
1 KIT INJECTION
Status: DISCONTINUED | OUTPATIENT
Start: 2023-07-14 | End: 2023-07-15 | Stop reason: HOSPADM

## 2023-07-14 RX ORDER — NICOTINE POLACRILEX 4 MG
15 LOZENGE BUCCAL
Status: DISCONTINUED | OUTPATIENT
Start: 2023-07-14 | End: 2023-07-15 | Stop reason: HOSPADM

## 2023-07-14 RX ORDER — SODIUM CHLORIDE 9 MG/ML
40 INJECTION, SOLUTION INTRAVENOUS AS NEEDED
Status: DISCONTINUED | OUTPATIENT
Start: 2023-07-14 | End: 2023-07-15 | Stop reason: HOSPADM

## 2023-07-14 RX ORDER — ROSUVASTATIN CALCIUM 20 MG/1
20 TABLET, COATED ORAL NIGHTLY
Status: DISCONTINUED | OUTPATIENT
Start: 2023-07-14 | End: 2023-07-15

## 2023-07-14 RX ORDER — INSULIN LISPRO 100 [IU]/ML
2-7 INJECTION, SOLUTION INTRAVENOUS; SUBCUTANEOUS
Status: DISCONTINUED | OUTPATIENT
Start: 2023-07-14 | End: 2023-07-15 | Stop reason: HOSPADM

## 2023-07-14 RX ORDER — MELATONIN
1000 DAILY
COMMUNITY

## 2023-07-14 RX ORDER — ASPIRIN 81 MG/1
81 TABLET, CHEWABLE ORAL DAILY
Status: CANCELLED | OUTPATIENT
Start: 2023-07-15

## 2023-07-14 RX ADMIN — INSULIN LISPRO 2 UNITS: 100 INJECTION, SOLUTION INTRAVENOUS; SUBCUTANEOUS at 21:32

## 2023-07-14 RX ADMIN — ROSUVASTATIN CALCIUM 20 MG: 20 TABLET, FILM COATED ORAL at 21:32

## 2023-07-14 RX ADMIN — IOPAMIDOL 130 ML: 755 INJECTION, SOLUTION INTRAVENOUS at 12:59

## 2023-07-14 RX ADMIN — Medication 10 ML: at 21:32

## 2023-07-14 NOTE — ED PROVIDER NOTES
Subjective   History of Present Illness  Patient is 89-year-old female for whom a code stroke was called.  Patient reports that intermittently over the last 3 days she has had slurred speech.  She denies any aphasic symptoms, visual disturbances, focal numbness or weakness, other symptoms or other complaints.  The family states that they have noticed some difficulty in finding words at times.  Patient states that she awoke this morning, was feeling well.  She lives by herself, states that she did not speak until about 10 AM, as there is no one there to speak to.  Family noted on the telephone that her speech was slurred at about 10 AM and brought her here for evaluation for possible stroke.  Patient has never previously had any symptoms like this.  She is on daily aspirin but no other anticoagulation.    Review of Systems   All other systems reviewed and are negative.    Past Medical History:   Diagnosis Date    Diabetes mellitus     Disease of thyroid gland     Hyperlipidemia     Proximal humerus fracture        Allergies   Allergen Reactions    Morphine And Related Rash       Past Surgical History:   Procedure Laterality Date    APPENDECTOMY      BREAST BIOPSY Left     benign    CARDIAC SURGERY      GALLBLADDER SURGERY      HYSTERECTOMY      HYSTERECTOMY         Family History   Problem Relation Age of Onset    Rheum arthritis Sister     Heart disease Sister     Diabetes Sister     Breast cancer Neg Hx        Social History     Socioeconomic History    Marital status:    Tobacco Use    Smoking status: Never    Smokeless tobacco: Never   Vaping Use    Vaping Use: Unknown   Substance and Sexual Activity    Alcohol use: No    Drug use: No    Sexual activity: Defer           Objective   Physical Exam  Vitals and nursing note reviewed.   Constitutional:       General: She is not in acute distress.     Appearance: Normal appearance. She is well-developed. She is not ill-appearing, toxic-appearing or diaphoretic.       Comments: Pleasant female, awake and alert, in no apparent acute distress.   HENT:      Head: Normocephalic and atraumatic.   Eyes:      General: No scleral icterus.     Pupils: Pupils are equal, round, and reactive to light.   Neck:      Trachea: No tracheal deviation.   Cardiovascular:      Rate and Rhythm: Normal rate and regular rhythm.   Pulmonary:      Effort: Pulmonary effort is normal. No respiratory distress.      Breath sounds: Normal breath sounds.   Chest:      Chest wall: No tenderness.   Abdominal:      General: Bowel sounds are normal.      Palpations: Abdomen is soft.      Tenderness: There is no abdominal tenderness. There is no guarding or rebound.   Musculoskeletal:         General: No tenderness. Normal range of motion.      Cervical back: Normal range of motion and neck supple. No rigidity or tenderness.      Right lower leg: No edema.      Left lower leg: No edema.   Skin:     General: Skin is warm and dry.      Capillary Refill: Capillary refill takes less than 2 seconds.      Coloration: Skin is not pale.   Neurological:      Mental Status: She is alert and oriented to person, place, and time.      GCS: GCS eye subscore is 4. GCS verbal subscore is 5. GCS motor subscore is 6.      Motor: No abnormal muscle tone.      Comments: Minimal dysarthria.  No other neurological deficits.  NIH stroke scale is 1.   Psychiatric:         Mood and Affect: Mood normal.         Behavior: Behavior normal.       Procedures  MRI Brain Without Contrast   Final Result       POSSIBLE SMALL AREA OF RESTRICTED DIFFUSION IN THE LEFT PARIETAL LOBE IN   THE REGION OF THE POSTCENTRAL GYRUS.  MAY REPRESENT A SMALL PERIPHERAL   CORTICAL INFARCT.  CORRELATE WITH CLINICAL SYMPTOMS.       This report was finalized on 7/15/2023 9:59 AM by Ida Mccrary MD.          XR Chest 1 View   Final Result   No radiographic evidence of acute cardiac or pulmonary disease.       This report was finalized on 7/14/2023 1:16 PM by   Sachin Renee MD.          CT CEREBRAL PERFUSION WITH & WITHOUT CONTRAST   Final Result    IMPRESSION:    No convincing evidence of focal perfusion defect on today's exam.       This report was finalized on 7/14/2023 1:02 PM by Dr. Sachin Renee MD.          CT Angiogram Neck   Final Result     Moderate to severe stenosis in the left internal carotid artery.       This report was finalized on 7/14/2023 1:06 PM by Dr. Sachin Renee MD.          CT Angiogram Head w AI Analysis of LVO   Final Result     No acute findings in the arteries of the head/brain.       This report was finalized on 7/14/2023 1:07 PM by Dr. Sachin Renee MD.          CT Head Without Contrast Stroke Protocol   Final Result        1. No evidence of an acute ischemic event   2. No parenchymal mass, hemorrhage, or midline shift   3. Results were relayed to the emergency department at 12:35 PM       This report was finalized on 7/14/2023 12:36 PM by Dr. Sachin Renee MD.            Results for orders placed or performed during the hospital encounter of 07/14/23   COVID-19 and FLU A/B PCR - Swab, Nasopharynx    Specimen: Nasopharynx; Swab   Result Value Ref Range    COVID19 Not Detected Not Detected - Ref. Range    Influenza A PCR Not Detected Not Detected    Influenza B PCR Not Detected Not Detected   Comprehensive Metabolic Panel    Specimen: Blood   Result Value Ref Range    Glucose 282 (H) 65 - 99 mg/dL    BUN 17 8 - 23 mg/dL    Creatinine 1.17 (H) 0.57 - 1.00 mg/dL    Sodium 139 136 - 145 mmol/L    Potassium 4.3 3.5 - 5.2 mmol/L    Chloride 104 98 - 107 mmol/L    CO2 21.9 (L) 22.0 - 29.0 mmol/L    Calcium 10.1 (H) 8.2 - 9.6 mg/dL    Total Protein 7.0 6.0 - 8.5 g/dL    Albumin 4.5 3.5 - 5.2 g/dL    ALT (SGPT) 17 1 - 33 U/L    AST (SGOT) 26 1 - 32 U/L    Alkaline Phosphatase 76 39 - 117 U/L    Total Bilirubin 0.3 0.0 - 1.2 mg/dL    Globulin 2.5 gm/dL    A/G Ratio 1.8 g/dL    BUN/Creatinine Ratio 14.5 7.0 - 25.0    Anion Gap 13.1 5.0 - 15.0 mmol/L     eGFR 43.6 (L) >60.0 mL/min/1.73   Protime-INR    Specimen: Blood   Result Value Ref Range    Protime 12.6 12.1 - 14.7 Seconds    INR 0.89 (L) 0.90 - 1.10   aPTT    Specimen: Blood   Result Value Ref Range    PTT 26.5 26.5 - 34.5 seconds   Single High Sensitivity Troponin T    Specimen: Blood   Result Value Ref Range    HS Troponin T 11 (H) <10 ng/L   CBC Auto Differential    Specimen: Blood   Result Value Ref Range    WBC 8.28 3.40 - 10.80 10*3/mm3    RBC 3.99 3.77 - 5.28 10*6/mm3    Hemoglobin 12.6 12.0 - 15.9 g/dL    Hematocrit 37.6 34.0 - 46.6 %    MCV 94.2 79.0 - 97.0 fL    MCH 31.6 26.6 - 33.0 pg    MCHC 33.5 31.5 - 35.7 g/dL    RDW 13.8 12.3 - 15.4 %    RDW-SD 47.8 37.0 - 54.0 fl    MPV 10.5 6.0 - 12.0 fL    Platelets 142 140 - 450 10*3/mm3    Neutrophil % 64.4 42.7 - 76.0 %    Lymphocyte % 22.6 19.6 - 45.3 %    Monocyte % 4.6 (L) 5.0 - 12.0 %    Eosinophil % 7.7 (H) 0.3 - 6.2 %    Basophil % 0.5 0.0 - 1.5 %    Immature Grans % 0.2 0.0 - 0.5 %    Neutrophils, Absolute 5.33 1.70 - 7.00 10*3/mm3    Lymphocytes, Absolute 1.87 0.70 - 3.10 10*3/mm3    Monocytes, Absolute 0.38 0.10 - 0.90 10*3/mm3    Eosinophils, Absolute 0.64 (H) 0.00 - 0.40 10*3/mm3    Basophils, Absolute 0.04 0.00 - 0.20 10*3/mm3    Immature Grans, Absolute 0.02 0.00 - 0.05 10*3/mm3    nRBC 0.0 0.0 - 0.2 /100 WBC   Urinalysis With Microscopic If Indicated (No Culture) - Urine, Clean Catch    Specimen: Urine, Clean Catch   Result Value Ref Range    Color, UA Yellow Yellow, Straw    Appearance, UA Clear Clear    pH, UA <=5.0 5.0 - 8.0    Specific Gravity, UA >1.030 (H) 1.005 - 1.030    Glucose, UA Negative Negative    Ketones, UA Negative Negative    Bilirubin, UA Negative Negative    Blood, UA Negative Negative    Protein, UA Negative Negative    Leuk Esterase, UA Negative Negative    Nitrite, UA Negative Negative    Urobilinogen, UA 0.2 E.U./dL 0.2 - 1.0 E.U./dL   TSH    Specimen: Blood   Result Value Ref Range    TSH 4.910 (H) 0.270 - 4.200  uIU/mL   Magnesium    Specimen: Blood   Result Value Ref Range    Magnesium 1.4 (L) 1.7 - 2.3 mg/dL   Hemoglobin A1c    Specimen: Blood   Result Value Ref Range    Hemoglobin A1C 7.30 (H) 4.80 - 5.60 %   Lipid Panel    Specimen: Blood   Result Value Ref Range    Total Cholesterol 128 0 - 200 mg/dL    Triglycerides 234 (H) 0 - 150 mg/dL    HDL Cholesterol 53 40 - 60 mg/dL    LDL Cholesterol  39 0 - 100 mg/dL    VLDL Cholesterol 36 5 - 40 mg/dL    LDL/HDL Ratio 0.53    Vitamin B12    Specimen: Blood   Result Value Ref Range    Vitamin B-12 775 211 - 946 pg/mL   Folate    Specimen: Blood   Result Value Ref Range    Folate >20.00 4.78 - 24.20 ng/mL   Comprehensive Metabolic Panel    Specimen: Blood   Result Value Ref Range    Glucose 181 (H) 65 - 99 mg/dL    BUN 13 8 - 23 mg/dL    Creatinine 1.04 (H) 0.57 - 1.00 mg/dL    Sodium 140 136 - 145 mmol/L    Potassium 3.6 3.5 - 5.2 mmol/L    Chloride 106 98 - 107 mmol/L    CO2 22.1 22.0 - 29.0 mmol/L    Calcium 9.4 8.2 - 9.6 mg/dL    Total Protein 6.0 6.0 - 8.5 g/dL    Albumin 3.7 3.5 - 5.2 g/dL    ALT (SGPT) 15 1 - 33 U/L    AST (SGOT) 21 1 - 32 U/L    Alkaline Phosphatase 61 39 - 117 U/L    Total Bilirubin 0.2 0.0 - 1.2 mg/dL    Globulin 2.3 gm/dL    A/G Ratio 1.6 g/dL    BUN/Creatinine Ratio 12.5 7.0 - 25.0    Anion Gap 11.9 5.0 - 15.0 mmol/L    eGFR 50.2 (L) >60.0 mL/min/1.73   POC Glucose Once    Specimen: Blood   Result Value Ref Range    Glucose 271 (H) 70 - 130 mg/dL   POC Creatinine    Specimen: Blood   Result Value Ref Range    Creatinine 1.10 0.60 - 1.30 mg/dL   POC Glucose Once    Specimen: Blood   Result Value Ref Range    Glucose 168 (H) 70 - 130 mg/dL   POC Glucose Once    Specimen: Blood   Result Value Ref Range    Glucose 147 (H) 70 - 130 mg/dL   POC Glucose Once    Specimen: Blood   Result Value Ref Range    Glucose 190 (H) 70 - 130 mg/dL   ECG 12 Lead Stroke Evaluation   Result Value Ref Range    QT Interval 332 ms    QTC Interval 432 ms   ECG 12 Lead Chest  Pain   Result Value Ref Range    QT Interval 358 ms    QTC Interval 433 ms   Adult Transthoracic Echo Complete W/ Cont if Necessary Per Protocol (With Agitated Saline)   Result Value Ref Range    LVIDd 4.4 cm    LVIDs 2.8 cm    IVSd 0.94 cm    LVPWd 0.85 cm    FS 38.0 %    IVS/LVPW 1.10 cm    ESV(cubed) 20.9 ml    LV Sys Vol (BSA corrected) 10.8 cm2    EDV(cubed) 87.5 ml    LV Maya Vol (BSA corrected) 33.3 cm2    LVOT area 3.1 cm2    LV mass(C)d 128.6 grams    LVOT diam 2.00 cm    EDV(MOD-sp4) 56.1 ml    ESV(MOD-sp4) 18.2 ml    SV(MOD-sp4) 37.9 ml    SI(MOD-sp4) 22.5 ml/m2    EF(MOD-sp4) 67.6 %    MV E max jesus 59.6 cm/sec    MV A max jesus 118.0 cm/sec    MV E/A 0.51     LA ESV Index (BP) 10.6 ml/m2    Med Peak E' Jesus 4.7 cm/sec    Lat Peak E' Jesus 8.5 cm/sec    Avg E/e' ratio 9.03     LA dimension (2D)  2.8 cm    Ao pk jesus 146.0 cm/sec    Ao max PG 8.5 mmHg    Ao mean PG 4.5 mmHg    Ao V2 VTI 23.1 cm    PA acc time 0.12 sec    Ao root diam 3.2 cm    ACS 0.90 cm   Type & Screen    Specimen: Blood   Result Value Ref Range    ABO Type A     RH type Positive     Antibody Screen Negative     T&S Expiration Date 7/17/2023 11:59:59 PM    ABO RH Specimen Verification    Specimen: Blood   Result Value Ref Range    ABO Type A     RH type Positive    Green Top (Gel)   Result Value Ref Range    Extra Tube Hold for add-ons.    Lavender Top   Result Value Ref Range    Extra Tube hold for add-on    Gold Top - SST   Result Value Ref Range    Extra Tube Hold for add-ons.    Light Blue Top   Result Value Ref Range    Extra Tube Hold for add-ons.                 ED Course  ED Course as of 07/23/23 0521   Fri Jul 14, 2023   1337 EKG shows sinus tachycardia, rate 102.  ND interval 216, QRS duration 70, QTc 432 ms.  Nonspecific ST-T changes.  No evidence for STEMI. [CM]   1620 Stroke neurologist Dr. Buenrostro has evaluated the patient, recommends that I speak with the interventional team at [facility] regarding the patient's moderate  to severe left ICA stenosis.  I spoke with stroke navigator there, [place].  He will have Dr. Dodson review the patient's imaging. [CM]   1634 Dr. Dodson reviewed the patient's images advised that there is some plaque in the left ICA, not an unexpected amount for the patient's age, advised that she did not need to be transferred to [facility], continue full dose aspirin and statin.  Per [place] stroke navigmarta. [CM]   8941 Discussed with Dr. Smith.  He is admitting patient to the hospitalist service. [CM]      ED Course User Index  [CM] Richie Almeida MD                                           Medical Decision Making  Problems Addressed:  Dysarthria: complicated acute illness or injury  Ischemic stroke: complicated acute illness or injury    Amount and/or Complexity of Data Reviewed  Labs: ordered. Decision-making details documented in ED Course.  Radiology: ordered. Decision-making details documented in ED Course.  ECG/medicine tests: ordered. Decision-making details documented in ED Course.    Risk  OTC drugs.  Prescription drug management.  Decision regarding hospitalization.        Final diagnoses:   Dysarthria   Ischemic stroke       ED Disposition  ED Disposition       ED Disposition   Decision to Admit    Condition   --    Comment   Level of Care: Telemetry [5]   Diagnosis: Dysarthria [784.51.ICD-9-CM]                 Please note that portions of this note were completed with a voice recognition program.                Richie Almeida MD  07/23/23 6213

## 2023-07-14 NOTE — CONSULTS
Teleneurology Consultation Note - Video    Date of Consultation: 07/14/2023 15:52 EDT  Reason for consult: Aphasia, Slurred speech  Location: ED  Last known well Date/Time: 07/11/2023 00:00 EDT  ED arrival Date/Time: 07/14/2023 12:57 EDT  Date/Time Telestroke doctor on phone: 07/14/2023 15:42 EDT  Date/Time Telestroke doctor on video: 07/14/2023 15:52 EDT  Date/time telestroke doctor assessment: 07/14/2023 15:52 EDT  Diagnosis: Acute ischemic stroke    Patient Demographics:  Age: 89  Gender: Female      Assessment:  89F with hx with of DM, seen due to ongoing intermittent dysarthria and described aphasia that first began 3 days ago.  NIH 1 for some trace dysarthria.  CT head negative for bleed.  She is outside the window for tPA consideration.  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  Not a candidate for thrombectomy.  However, given her presenting endorsed symptoms and this imaging result, concern for the possibility of a symptomatic carotid artery.  In this setting, would recommend consultation with neuro interventional team for the possibility of further evaluation and intervention.  If she is deemed not to be a candidate for intervention, may consider admission at local hospital for further risk factor evaluation and optimization.  Please consult inpatient neurology to help guide these initial recommendations.  Ongoing cares per the patient's ED/admitting physicians.      Recommendation:  - Admit to Teleunit  - MRI brain without contrast  - Consider TTE with bubble  - Check EKG and troponin  - Lipid profile and A1C  - B12, Folate, UA  - Permissive hypertension up to 220/110 mmhg. PRN labetalol for SBP > 220 mmhg  - IVF with 0.9% NS at 75 cc/hour  - Aspirin 325 mg daily. Consider 300 mg MA if patient can't tolerate PO  - High-dose, high intensity statin  - Stroke education to patient and family  - PT/OT and speech evaluation and treatment  - Keep  POC glucose between < 180. Avoid hypoglycemia.  - Keep Normothermic. PRN tylenol of T > 100.4.  - DVT PPX  - Bedside RN dysphagia screen before starting any oral intake      Follow-up recommendations: In this patient with acute neurologic deficits, we would recommend that the inpatient neurology team be consulted for further ongoing recommendations. Please be sure to place the consult through EMR. Please call us at [facility] # [phone] and press 1 for emergent and press 2 for non-emergent if you have any questions.    Updated ED provider with recommendations at: 2023 16:15 EDT      History of Presenting Illness:  89F with hx with of DM, seen due to ongoing intermittent dysarthria.  Symptoms were first noted 3 days ago.  The patient however notes that she lives alone and is not sure exactly how long they have been going on.  The slurred speech as well as some difficulties with speech production was noted by the patient's family today leading to her presentation to the ED.  She denied any associated headaches, vision loss, facial droop, dysphagia, or other unilateral or bilateral weakness or numbness.    Review of system: The patient's pertinent positive and negatives for the problem focused assessment is documented in HPI.    I have reviewed the chart for pertinent medical, surgical and social history.  Allergies: Reviewed in EMR    Pertinent medication prior to arrival:   Antiplatelet prior to arrival? Aspirin  Anticoagulation prior to arrival? None    NIHSS:  NIHSS time: 2023 15:52 EDT  1a Level of consciousness: 0  1b LOC questions: 0  1c LOC commands: 0  2 Best Gaze: 0  3 Visual: 0  4 Facial Palsy: 0  5a Motor left arm: 0  5b Motor right arm: 0  6a Motor left le  6b Motor right le  7 Limb ataxia: 0  8 Sensory: 0  9 Best language: 0  10 Dysarthria: 1  11 Extinction and inattention: 0  Total: 1      Pre-stroke mRS: 0    Personal review of CNS Imaging:  CTH performed? Yes  CT Interpretation date  and time: 07/14/2023 15:45 EDT  Hemorrhage vs non- hemorrhage? No acute intracranial hemorrhage      CTA head and neck performed? Yes  CTA review date and time: 07/14/2023 15:51 EDT  Results of CTA: Other    Large Vessel Occlusion:  LVO present? No  LVO exclusion reason: No LVO present      Teleneurology patient verification & consent    I have proceeded with this evaluation at the direct request of the referring practitioner as this is felt to be an emergency setting and no appropriate specialist is available at bedside to perform these evaluations. The [facility] practitioner has reported to me this is the correct patient and has obtained verbal consent from the patient/surrogate to perform this voluntary telemedicine evaluation (including obtaining history, performing examination and reviewing data provided by the patient and/or [facility] care provider). I attest that I introduced myself to the patient, provided my credentials, disclosed my location, and determined that, based on review of the patient's chart and discussion with the patient's primary team, telemedicine via a HIPAA-compliant, real-time, face-to-face, two-way, interactive audio and video platform is an appropriate and effective means of providing this service. The patient/surrogate has the right to refuse this evaluation as they have been explained risks (including potential loss of confidentiality), benefits, alternatives, and the potential need for subsequent face to face care. Patient/surrogate understands that there is a risk of medical inaccuracies given that our recommendations will be made based on reported data (and we must therefore assume this information is accurate). Knowing that there is a risk that this information is not reported accurately, and that the telemedicine video, audio, or data feed may be incomplete, the patient agrees to proceed with evaluation and holds us harmless knowing these  risks. In this evaluation, we will be providing recommendations only. The ultimate decision to follow, or not follow, these recommendations will be left to the bedside treating/requesting practitioner. The patient/surrogate has been notified that other healthcare professionals (including technical personnel) may be involved in this audio-video evaluation. All laws concerning confidentiality and patient access to medical records and copies of medical records apply to telemedicine. The patient/surrogate has received the originating site's Health Notice of Privacy Practices.    Medical Data Reviewed:  Data reviewed include clinical labs, radiology, medical test;  Obtaining/reviewing old medical records;  Obtaining case history from another source;  Independent review of CNS images    IYolanda MD, saw patient on 07/14/2023, 15:52 EDT for an initial in-patient or emergency room telemedicine face-to-face consult using interactive technology and have spent  greater than 45 minutes of time on the care of the patient today including time spent reviewing medical records, reviewing laboratory data, radiology, and other diagnostic tests, coordinating care with ancillary staff in addition to examining the patient and formulating a plan of care, discussing and counseling the patient and family. Greater than 50 percent of this time was spent in face to face and coordination of care. The location of the patient was at [facility]. My location was [place]. I was assisted with the exam by Nurse

## 2023-07-14 NOTE — H&P
[facility] Medicine Services  History & Physical    Patient Identification:  Name:  Raven Ramirez  Age:  89 y.o.  Sex:  female  :  1930  MRN:  [id number]   Visit Number:  [id number]  Admit Date: 2023   Primary Care Physician:  Abdoul Goyal MD    Subjective     Chief complaint: Dysarthria    History of presenting illness:      Raven Ramirez is a 89 y.o. female with past medical history significant for non-insulin dependent type 2 diabetes mellitus, essential hypertension, hypothyroidism, hyperlipidemia, CAD s/p stenting, and advanced age. Patient presents to [facility] emergency department today for further evaluation of intermittent dysarthria and aphasia which initially began around 2 days ago and has been ongoing since that time. CODE STROKE was activated on arrival. CT of the head was negative for bleed. She is outside the window for tPA consideration.  CT angiogram of head and neck obtained; read as showing moderate to severe stenosis of the left ICA. Neurology consult was placed in the emergency department. Patient was evaluated via TeleNe[employer] cart.  They have recommended admission for further work-up, including MRI of the brain. Also recommending 325 mg aspirin daily along with high-dose, high intensity statin. On exam, patient was resting on ED stretcher with no signs of acute distress. She is a very healthy appearing 89 y.o. Pleasant. Alert and oriented x 4. She says that she first noticed symptoms on Wednesday evening while at [employer]. She says that she would be thinking clearly of what she wanted to say, but struggled to get the word out and had difficulty completing full sentences. She says that she lives alone and usually does not talk. When communicating with family, she usually texts on her cell phone. Therefore, she says she really didn't notice speech abnormality again until this morning while on the phone with her daughter. She says that the  more frustrated she became with trying to speak, the more her symptoms worsened. She denies any peripheral numbness or weakness. Denies headache, visual disturbance, chest pain, shortness of breath, palpitations, or neck pain. She endorses occasional dizziness in the recent months. She says that she has mentioned this to her PCP Dr. Goyal and he discontinued her HCTZ. She also says that she struggles to drink enough water because she doesn't like the taste. She has been trying to drink more water on a daily basis and states that this has also helped to improve her dizziness. She denies any recent falls or injuries. States that she is completely independent with ADLs and IADLs. Does not use any assistive devices for ambulation. No supplemental O2. Does not smoke or drink alcohol. She says that she takes her medications every day as prescribed. Has been taking 325 mg aspirin daily for approximately 15 years after having a heart attack and requiring stent placement. She denies any bleeding issues and has tolerated high dose aspirin well for many years. She is also on a statin. She states that she was recently taken off her Metformin because her HgbA1c improved. Discussed plan for admission with patient and family members at bedside. Patient voiced agreement and understanding. I also told the patient Happy Birthday, as today is her 89rd birthday.     Upon arrival to the ED, vital signs were temperature 97.6, pulse 102, respirations 17, blood pressure 162/83, SPO2 saturation 98% on room air.  High-sensitivity troponin T 11, repeat pending.  CMP with CO2 21.9, creatinine 1.17, EGFR 43.6, glucose 282, calcium 10.1, magnesium 1.4, otherwise unremarkable.  TSH 4.910.  Free T4 pending.  PT 12.6, INR 0.89.  CBC within normal limits.  COVID-19 and flu A/B swab negative.  Chest x-ray unremarkable.  CT of the head without contrast with no evidence of an acute ischemic event.  No parenchymal mass, hemorrhage, or midline shift.   CTA of the head and neck with no acute findings in the brain.  Noted moderate to severe stenosis in the left internal carotid artery.  CT cerebral perfusion without convincing evidence of focal perfusion defect.    Known Emergency Department medications received prior to my evaluation included N/A, no medications administered. Emergency Department Room location at the time of my evaluation was 116.  Discussed with admitting physician, Blane Palacio DO.    ---------------------------------------------------------------------------------------------------------------------   Review of Systems   Constitutional:  Negative for chills, fatigue and fever.   HENT:  Negative for congestion, sore throat and trouble swallowing.    Eyes:  Negative for photophobia and visual disturbance.   Respiratory:  Negative for cough, shortness of breath and wheezing.    Cardiovascular:  Negative for chest pain, palpitations and leg swelling.   Gastrointestinal:  Negative for abdominal pain, constipation, diarrhea, nausea and vomiting.   Genitourinary:  Negative for difficulty urinating, flank pain, frequency and urgency.   Musculoskeletal:  Negative for back pain, gait problem, neck pain and neck stiffness.   Neurological:  Positive for dizziness (occasional) and speech difficulty. Negative for tremors, seizures, syncope, facial asymmetry, weakness, light-headedness, numbness and headaches.   Psychiatric/Behavioral:  Negative for confusion.     ---------------------------------------------------------------------------------------------------------------------   Past Medical History:   Diagnosis Date    Diabetes mellitus     Disease of thyroid gland     Hyperlipidemia     Proximal humerus fracture      Past Surgical History:   Procedure Laterality Date    APPENDECTOMY      BREAST BIOPSY Left     benign    CARDIAC SURGERY      GALLBLADDER SURGERY      HYSTERECTOMY      HYSTERECTOMY       Family History   Problem Relation Age of Onset     Rheum arthritis Sister     Heart disease Sister     Diabetes Sister     Breast cancer Neg Hx      Social History     Socioeconomic History    Marital status:    Tobacco Use    Smoking status: Never    Smokeless tobacco: Never   Substance and Sexual Activity    Alcohol use: No    Drug use: No     ---------------------------------------------------------------------------------------------------------------------   Allergies:  Morphine and related  ---------------------------------------------------------------------------------------------------------------------   Home medications:    Medications below are reported home medications pulling from within the system; at this time, these medications have not been reconciled unless otherwise specified and are in the verification process for further verifcation as current home medications.  (Not in a hospital admission)      Hospital Scheduled Meds:  aspirin, 324 mg, Oral, Once           Current listed hospital scheduled medications may not yet reflect those currently placed in orders that are signed and held awaiting patient's arrival to floor.   ---------------------------------------------------------------------------------------------------------------------     Objective     Vital Signs:  Temp:  [97.6 °F (36.4 °C)] 97.6 °F (36.4 °C)  Heart Rate:  [] 89  Resp:  [17-18] 18  BP: ()/() 100/86      07/14/23  1224   Weight: 62.6 kg (138 lb)     Body mass index is 26.07 kg/m².  ---------------------------------------------------------------------------------------------------------------------       Physical Exam  Vitals and nursing note reviewed.   Constitutional:       General: She is awake. She is not in acute distress.     Appearance: She is not ill-appearing or diaphoretic.      Comments: Room air.   HENT:      Head: Normocephalic and atraumatic.      Mouth/Throat:      Mouth: Mucous membranes are moist.      Pharynx: Oropharynx is clear.    Eyes:      Extraocular Movements: Extraocular movements intact.      Pupils: Pupils are equal, round, and reactive to light.   Cardiovascular:      Rate and Rhythm: Normal rate and regular rhythm.      Pulses: Normal pulses.           Dorsalis pedis pulses are 2+ on the right side and 2+ on the left side.      Heart sounds: Normal heart sounds.     No friction rub.   Pulmonary:      Effort: Pulmonary effort is normal. No accessory muscle usage, respiratory distress or retractions.      Breath sounds: No wheezing, rhonchi or rales.   Abdominal:      General: Bowel sounds are normal. There is no distension.      Palpations: Abdomen is soft.      Tenderness: There is no abdominal tenderness. There is no guarding.   Musculoskeletal:      Cervical back: Neck supple. No rigidity.      Right lower leg: No edema.      Left lower leg: No edema.   Skin:     General: Skin is warm and dry.      Capillary Refill: Capillary refill takes 2 to 3 seconds.   Neurological:      Mental Status: She is alert and oriented to person, place, and time. Mental status is at baseline.      Cranial Nerves: No dysarthria or facial asymmetry.      Sensory: Sensation is intact.      Motor: No weakness or tremor.   Psychiatric:         Attention and Perception: Attention normal.         Mood and Affect: Mood normal.         Speech: Speech is not slurred.         Behavior: Behavior normal. Behavior is cooperative.         Thought Content: Thought content normal.         Cognition and Memory: Cognition and memory normal.         Judgment: Judgment normal.      Comments: Patient is alert and oriented x4. Speech is not slurred, however she does seem to have difficulty word finding at times.      ---------------------------------------------------------------------------------------------------------------------  EKG: Pending formal cardiology interpretation.  Per my review, appears sinus tachycardia in the 100s with first-degree AV block.  No evidence  of acute ischemia.      Telemetry:  Appearing normal sinus rhythm 80s on my exam.   I have personally looked at both the EKG and the telemetry strips.  ---------------------------------------------------------------------------------------------------------------------   Results from last 7 days   Lab Units 07/14/23  1233   WBC 10*3/mm3 8.28   HEMOGLOBIN g/dL 12.6   HEMATOCRIT % 37.6   MCV fL 94.2   MCHC g/dL 33.5   PLATELETS 10*3/mm3 142   INR  0.89*         Results from last 7 days   Lab Units 07/14/23  1235 07/14/23  1233   SODIUM mmol/L  --  139   POTASSIUM mmol/L  --  4.3   MAGNESIUM mg/dL  --  1.4*   CHLORIDE mmol/L  --  104   CO2 mmol/L  --  21.9*   BUN mg/dL  --  17   CREATININE mg/dL 1.10 1.17*   CALCIUM mg/dL  --  10.1*   GLUCOSE mg/dL  --  282*   ALBUMIN g/dL  --  4.5   BILIRUBIN mg/dL  --  0.3   ALK PHOS U/L  --  76   AST (SGOT) U/L  --  26   ALT (SGPT) U/L  --  17   Estimated Creatinine Clearance: 27.1 mL/min (by C-G formula based on SCr of 1.1 mg/dL).  No results found for: AMMONIA  Results from last 7 days   Lab Units 07/14/23  1233   HSTROP T ng/L 11*         No results found for: HGBA1C  Lab Results   Component Value Date    TSH 4.910 (H) 07/14/2023     No results found for: PREGTESTUR, PREGSERUM, HCG, HCGQUANT  Pain Management Panel           No data to display                    ---------------------------------------------------------------------------------------------------------------------  Imaging Results (Last 7 Days)       Procedure Component Value Units Date/Time    XR Chest 1 View [[id number]] Collected: 07/14/23 1315     Updated: 07/14/23 1318    Narrative:      XR CHEST 1 VW-     CLINICAL INDICATION: Acute Stroke Protocol (onset < 12 hrs)        COMPARISON: None available      TECHNIQUE: Single frontal view of the chest.     FINDINGS:      LUNGS: Lungs are adequately aerated.      HEART AND MEDIASTINUM: Heart and mediastinal contours are unremarkable        SKELETON: Bony and soft  tissue structures are unremarkable.     Artifact in the midline, presumably from the patient's bra       Impression:      No radiographic evidence of acute cardiac or pulmonary disease.     This report was finalized on 7/14/2023 1:16 PM by Dr. Sachin Renee MD.       CT Angiogram Head w AI Analysis of LVO [[id number]] Collected: 07/14/23 1306     Updated: 07/14/23 1309    Narrative:      EXAM:    CT Angiography Head With Intravenous Contrast     EXAM DATE:    7/14/2023 12:34 PM     CLINICAL HISTORY:    Stroke, follow up     TECHNIQUE:    Axial computed tomographic angiography images of the head with  intravenous contrast. LVO analysis was performed with [email or web address] software.   This CT exam was performed using one or more of the following dose  reduction techniques:  automated exposure control, adjustment of the mA  and/or kV according to patient size, and/or use of iterative  reconstruction technique.    MIP reconstructed images were created and reviewed.     COMPARISON:    No relevant prior studies available.     FINDINGS:    RIGHT INTERNAL CAROTID ARTERY:  No acute findings.  Intracranial  segment is patent with no significant stenosis.  No aneurysm.    RIGHT ANTERIOR CEREBRAL ARTERY:  Unremarkable.  No occlusion or  significant stenosis.  No aneurysm.    RIGHT MIDDLE CEREBRAL ARTERY:  Unremarkable.  No occlusion or  significant stenosis.  No aneurysm.    RIGHT POSTERIOR CEREBRAL ARTERY:  Unremarkable.  No occlusion or  significant stenosis.  No aneurysm.    RIGHT VERTEBRAL ARTERY:  Unremarkable as visualized.       LEFT INTERNAL CAROTID ARTERY:  No acute findings.  Intracranial  segment is patent with no significant stenosis.  No aneurysm.    LEFT ANTERIOR CEREBRAL ARTERY:  Unremarkable.  No occlusion or  significant stenosis.  No aneurysm.    LEFT MIDDLE CEREBRAL ARTERY:  Unremarkable.  No occlusion or  significant stenosis.  No aneurysm.    LEFT POSTERIOR CEREBRAL ARTERY:  Unremarkable.  No occlusion  or  significant stenosis.  No aneurysm.    LEFT VERTEBRAL ARTERY:  Unremarkable as visualized.       BASILAR ARTERY:  Unremarkable.  No occlusion or significant stenosis.   No aneurysm.       Impression:        No acute findings in the arteries of the head/brain.     This report was finalized on 7/14/2023 1:07 PM by Dr. Sachin Renee MD.       CT Angiogram Neck [[id number]] Collected: 07/14/23 1305     Updated: 07/14/23 1308    Narrative:      EXAM:    CT Angiography Neck With Intravenous Contrast     EXAM DATE:    7/14/2023 12:35 PM     CLINICAL HISTORY:    Stroke, follow up     TECHNIQUE:    Axial computed tomographic angiography images of the neck with  intravenous contrast.  This CT exam was performed using one or more of  the following dose reduction techniques:  automated exposure control,  adjustment of the mA and/or kV according to patient size, and/or use of  iterative reconstruction technique.    MIP reconstructed images were created and reviewed.     COMPARISON:    No relevant prior studies available.     FINDINGS:      VASCULATURE:    RIGHT COMMON CAROTID ARTERY:  Unremarkable.  No significant stenosis.   No dissection or occlusion.    RIGHT INTERNAL CAROTID ARTERY:  Unremarkable.  Extracranial segment is  patent with no significant stenosis.  No dissection or occlusion.    RIGHT EXTERNAL CAROTID ARTERY:  Unremarkable.  No occlusion.    RIGHT VERTEBRAL ARTERY:  Unremarkable.  No significant stenosis.  No  dissection or occlusion.       LEFT COMMON CAROTID ARTERY:  Unremarkable.  No significant stenosis.   No dissection or occlusion.    LEFT INTERNAL CAROTID ARTERY:  Moderate to severe stenosis in the left  internal carotid artery.  No dissection or occlusion.    LEFT EXTERNAL CAROTID ARTERY:  Unremarkable.  No occlusion.    LEFT VERTEBRAL ARTERY:  Unremarkable.  No significant stenosis.  No  dissection or occlusion.      NECK:    BONES/JOINTS:  No acute fracture.  No dislocation.    SOFT TISSUES:   Unremarkable as visualized.  No mass.      CAROTID STENOSIS REFERENCE USING NASCET CRITERIA:    % ICA stenosis = (1 - narrowest ICA diameter/diameter of distal  cervical ICA) x 100.    Mild - <50% stenosis.    Moderate - 50-69% stenosis.    Severe - 70-94% stenosis.    Near occlusion - 95-99% stenosis.    Occluded - 100% stenosis.       Impression:        Moderate to severe stenosis in the left internal carotid artery.     This report was finalized on 7/14/2023 1:06 PM by Dr. Sachin Renee MD.       CT CEREBRAL PERFUSION WITH & WITHOUT CONTRAST [[id number]] Collected: 07/14/23 1301     Updated: 07/14/23 1304    Narrative:      EXAMINATION: CT CEREBRAL PERFUSION W WO CONTRAST-      CLINICAL INDICATION: Neuro deficit, acute, stroke suspected        COMPARISON: Unenhanced CT of the brain performed the same day      DOSE:     Radiation dose reduction techniques were utilized per ALARA protocol.  Automated exposure control was initiated through either or [employer] or  [employer] software packages by  protocol.           FINDINGS:     Ischemic tissue volume (Tmax > 6 seconds, includes core and penumbra): 3  cc     Ischemic core volume (rCBF < 30%): 0 cc  Mismatch (penumbra) volume :3 cc  Mismatches ratio: Infinite       Impression:       IMPRESSION:   No convincing evidence of focal perfusion defect on today's exam.     This report was finalized on 7/14/2023 1:02 PM by Dr. Sachin Renee MD.       CT Head Without Contrast Stroke Protocol [[id number]] Collected: 07/14/23 1235     Updated: 07/14/23 1238    Narrative:      CT HEAD WO CONTRAST STROKE PROTOCOL-     CLINICAL INDICATION: Neuro deficit, acute stroke suspected        COMPARISON: None available      TECHNIQUE: Axial images of the brain were obtained with out intravenous  contrast.  Reformatted images were created in the sagittal and coronal  planes.     DOSE:     Radiation dose reduction techniques were utilized per ALARA protocol.  Automated exposure  control was initiated through either or [employer] or  [employer] software packages by  protocol.        FINDINGS:    BRAIN:  Unremarkable.  No hemorrhage.  No significant white matter  disease.  No edema.       VENTRICLES:  Unremarkable.  No ventriculomegaly.       BONES/JOINTS:  Unremarkable.  No acute fracture.       SOFT TISSUES:  Unremarkable.       SINUSES:  no air fluid levels       MASTOID AIR CELLS:  Unremarkable as visualized.  No mastoid effusion.          Impression:          1. No evidence of an acute ischemic event  2. No parenchymal mass, hemorrhage, or midline shift  3. Results were relayed to the emergency department at 12:35 PM     This report was finalized on 7/14/2023 12:36 PM by Dr. Sachin Renee MD.                 Last echocardiogram:          I have personally reviewed the above radiology images and read the final radiology report on 07/14/23  ---------------------------------------------------------------------------------------------------------------------  Assessment / Plan     Active Hospital Problems    Diagnosis  POA    **Dysarthria [R47.1]  Yes       ASSESSMENT/PLAN:  #New onset of intermittent expressive aphasia x2 days ago  #Acute ischemic stroke rule out  --Mechanism: cardioembolic vs atherothrombotic/atherosclerotic (carotid stenosis) vs small vessel disease (lacunar) vs arteriothrombotic (artery to artery) No TPA d/t symptoms presented outside of window. No thrombectomy d/t no thrombus on imaging.  --Risk factors: CAD, T2DM, HLD, age, female.  --Stroke labs: TSH, A1C, troponins, b12, folate.  --CT head: No evidence of acute ischemic event. No parenchymal mass, hemorrhage, or midline shift.  --CTA head and neck: No acute findings in the brain. Moderate to severe stenosis in the left internal carotid artery.  --CT cerebral perfusion without any convincing evidence of focal perfusion defect.  --MRI head w/o ordered, pending.  --Bedside dysphagia screen prior to oral  intake.  --Echocardiogram with bubble study.  --Secondary prophylaxis ASA + atorvastatin.  --NIHSS scale on admission and every shift.  --Neuro checks Q 4 hours.  --Permissive hypertension up to 220/110 mmHg.  --PRN labetalol for SBP > 220 mmHg.  --PT/OT/SLP evaluations per protocol.  --Fall precautions.   --Diabetes educator consult.    #CAD status post stenting  --Patient reports having a heart attack around 15 years ago; has been taking high-dose aspirin daily since that time.   --She denies any recent or current chest pain on exam.   --EKG without evidence of acute ischemia.   --HS Troponin T indeterminately elevated at 11; repeat pending.   --Continuous cardiac monitoring ordered.  --Obtain HgbA1c and lipid profile, as outlined in plan above.    #Essential hypertension  --BP appears well controlled.  --Review home antihypertensive regimen once reconciled per pharmacy.   --Hold antihypertensive agents for now to allow for permissive hypertension 24 hours per neurology recommendations.   --Closely monitor BP per hospital protocol, titrate medications as necessary.    #Hypothyroidism  --TSH only very mildly elevated at 4.910.  --Plan to resume home levothyroxine dosage when reconciled per pharmacy.     #Type II diabetes mellitus, non insulin dependent  --Obtain HgbA1C.  --Patient states that she currently takes no medications for diabetes; Metformin recently discontinued due to improvement in A1c.   --Start low dose SSI, titrate dosage as necessary.  --Closely monitor blood glucose levels with accuchecks AC and HS.  --Hypoglycemia protocol in place.           ----------  -DVT prophylaxis: SCDs.  -Activity: As tolerated. Fall precautions.   -Expected length of stay:   OBSERVATION status; however, if further evaluation or treatment plans warrant, status may change.  Based upon current information, I predict patient's care encounter to be less than or equal to 2 midnights   -Disposition plans to return home on  discharge once medically stable.    High risk secondary to new onset of intermittent expressive aphasia 2 days ago which has persisted, concerning for acute ischemic stroke.       CODE STATUS: DNR/DNI, discussed with patient and family at bedside.       Allyson Kaleigh, APRN   07/14/23  18:13 EDT  Pager #[phone]  ---------------------------------------------------------------------------------------------------------------------

## 2023-07-14 NOTE — ED NOTES
Pt POC creat 1.1 and GFR 33, Dr. Almeida  made aware with [facility] to proceed with CT angio/perfusion studies.

## 2023-07-14 NOTE — ED NOTES
Pt states that she really began having difficulty getting her words out Wednesday night at [facility]. Pt now states that she seems like her speech is worse today and her mind is working fast  but she isn't able to speak what she is thinking. Pt states that she is unsure of what time it may have changed because she lives alone and does not talk to anyone but noticed it worse today when she started talking to her family sometime this morning. Code stroke called per Dr. Almeida present at bedside. Pt transported to CT with stroke team at bedside.

## 2023-07-14 NOTE — ED NOTES
Pt is on telehealth with neurologist. His recommendation is to admit pt to hospital and perform MRI.

## 2023-07-15 ENCOUNTER — APPOINTMENT (OUTPATIENT)
Dept: CARDIOLOGY | Facility: HOSPITAL | Age: 88
End: 2023-07-15
Payer: MEDICARE

## 2023-07-15 ENCOUNTER — APPOINTMENT (OUTPATIENT)
Dept: MRI IMAGING | Facility: HOSPITAL | Age: 88
End: 2023-07-15
Payer: MEDICARE

## 2023-07-15 VITALS
HEIGHT: 61 IN | OXYGEN SATURATION: 96 % | SYSTOLIC BLOOD PRESSURE: 132 MMHG | WEIGHT: 153.22 LBS | TEMPERATURE: 98.1 F | HEART RATE: 95 BPM | BODY MASS INDEX: 28.93 KG/M2 | RESPIRATION RATE: 18 BRPM | DIASTOLIC BLOOD PRESSURE: 80 MMHG

## 2023-07-15 LAB
ALBUMIN SERPL-MCNC: 3.7 G/DL (ref 3.5–5.2)
ALBUMIN/GLOB SERPL: 1.6 G/DL
ALP SERPL-CCNC: 61 U/L (ref 39–117)
ALT SERPL W P-5'-P-CCNC: 15 U/L (ref 1–33)
ANION GAP SERPL CALCULATED.3IONS-SCNC: 11.9 MMOL/L (ref 5–15)
AST SERPL-CCNC: 21 U/L (ref 1–32)
BH CV ECHO MEAS - ACS: 0.9 CM
BH CV ECHO MEAS - AO MAX PG: 8.5 MMHG
BH CV ECHO MEAS - AO MEAN PG: 4.5 MMHG
BH CV ECHO MEAS - AO ROOT DIAM: 3.2 CM
BH CV ECHO MEAS - AO V2 MAX: 146 CM/SEC
BH CV ECHO MEAS - AO V2 VTI: 23.1 CM
BH CV ECHO MEAS - EDV(CUBED): 87.5 ML
BH CV ECHO MEAS - EDV(MOD-SP4): 56.1 ML
BH CV ECHO MEAS - EF(MOD-SP4): 67.6 %
BH CV ECHO MEAS - ESV(CUBED): 20.9 ML
BH CV ECHO MEAS - ESV(MOD-SP4): 18.2 ML
BH CV ECHO MEAS - FS: 38 %
BH CV ECHO MEAS - IVS/LVPW: 1.1 CM
BH CV ECHO MEAS - IVSD: 0.94 CM
BH CV ECHO MEAS - LA DIMENSION: 2.8 CM
BH CV ECHO MEAS - LAT PEAK E' VEL: 8.5 CM/SEC
BH CV ECHO MEAS - LV DIASTOLIC VOL/BSA (35-75): 33.3 CM2
BH CV ECHO MEAS - LV MASS(C)D: 128.6 GRAMS
BH CV ECHO MEAS - LV SYSTOLIC VOL/BSA (12-30): 10.8 CM2
BH CV ECHO MEAS - LVIDD: 4.4 CM
BH CV ECHO MEAS - LVIDS: 2.8 CM
BH CV ECHO MEAS - LVOT AREA: 3.1 CM2
BH CV ECHO MEAS - LVOT DIAM: 2 CM
BH CV ECHO MEAS - LVPWD: 0.85 CM
BH CV ECHO MEAS - MED PEAK E' VEL: 4.7 CM/SEC
BH CV ECHO MEAS - MV A MAX VEL: 118 CM/SEC
BH CV ECHO MEAS - MV E MAX VEL: 59.6 CM/SEC
BH CV ECHO MEAS - MV E/A: 0.51
BH CV ECHO MEAS - PA ACC TIME: 0.12 SEC
BH CV ECHO MEAS - SI(MOD-SP4): 22.5 ML/M2
BH CV ECHO MEAS - SV(MOD-SP4): 37.9 ML
BH CV ECHO MEASUREMENTS AVERAGE E/E' RATIO: 9.03
BILIRUB SERPL-MCNC: 0.2 MG/DL (ref 0–1.2)
BILIRUB UR QL STRIP: NEGATIVE
BUN SERPL-MCNC: 13 MG/DL (ref 8–23)
BUN/CREAT SERPL: 12.5 (ref 7–25)
CALCIUM SPEC-SCNC: 9.4 MG/DL (ref 8.2–9.6)
CHLORIDE SERPL-SCNC: 106 MMOL/L (ref 98–107)
CHOLEST SERPL-MCNC: 128 MG/DL (ref 0–200)
CLARITY UR: CLEAR
CO2 SERPL-SCNC: 22.1 MMOL/L (ref 22–29)
COLOR UR: YELLOW
CREAT SERPL-MCNC: 1.04 MG/DL (ref 0.57–1)
EGFRCR SERPLBLD CKD-EPI 2021: 50.2 ML/MIN/1.73
FOLATE SERPL-MCNC: >20 NG/ML (ref 4.78–24.2)
GLOBULIN UR ELPH-MCNC: 2.3 GM/DL
GLUCOSE BLDC GLUCOMTR-MCNC: 147 MG/DL (ref 70–130)
GLUCOSE BLDC GLUCOMTR-MCNC: 190 MG/DL (ref 70–130)
GLUCOSE SERPL-MCNC: 181 MG/DL (ref 65–99)
GLUCOSE UR STRIP-MCNC: NEGATIVE MG/DL
HBA1C MFR BLD: 7.3 % (ref 4.8–5.6)
HDLC SERPL-MCNC: 53 MG/DL (ref 40–60)
HGB UR QL STRIP.AUTO: NEGATIVE
KETONES UR QL STRIP: NEGATIVE
LDLC SERPL CALC-MCNC: 39 MG/DL (ref 0–100)
LDLC/HDLC SERPL: 0.53 {RATIO}
LEFT ATRIUM VOLUME INDEX: 10.6 ML/M2
LEUKOCYTE ESTERASE UR QL STRIP.AUTO: NEGATIVE
NITRITE UR QL STRIP: NEGATIVE
PH UR STRIP.AUTO: <=5 [PH] (ref 5–8)
POTASSIUM SERPL-SCNC: 3.6 MMOL/L (ref 3.5–5.2)
PROT SERPL-MCNC: 6 G/DL (ref 6–8.5)
PROT UR QL STRIP: NEGATIVE
SODIUM SERPL-SCNC: 140 MMOL/L (ref 136–145)
SP GR UR STRIP: >1.03 (ref 1–1.03)
TRIGL SERPL-MCNC: 234 MG/DL (ref 0–150)
UROBILINOGEN UR QL STRIP: ABNORMAL
VIT B12 BLD-MCNC: 775 PG/ML (ref 211–946)
VLDLC SERPL-MCNC: 36 MG/DL (ref 5–40)

## 2023-07-15 PROCEDURE — 93306 TTE W/DOPPLER COMPLETE: CPT

## 2023-07-15 PROCEDURE — 82948 REAGENT STRIP/BLOOD GLUCOSE: CPT

## 2023-07-15 PROCEDURE — G0378 HOSPITAL OBSERVATION PER HR: HCPCS

## 2023-07-15 PROCEDURE — 97162 PT EVAL MOD COMPLEX 30 MIN: CPT

## 2023-07-15 PROCEDURE — 70551 MRI BRAIN STEM W/O DYE: CPT | Performed by: RADIOLOGY

## 2023-07-15 PROCEDURE — 70551 MRI BRAIN STEM W/O DYE: CPT

## 2023-07-15 PROCEDURE — 82746 ASSAY OF FOLIC ACID SERUM: CPT | Performed by: STUDENT IN AN ORGANIZED HEALTH CARE EDUCATION/TRAINING PROGRAM

## 2023-07-15 PROCEDURE — 92610 EVALUATE SWALLOWING FUNCTION: CPT

## 2023-07-15 PROCEDURE — 81003 URINALYSIS AUTO W/O SCOPE: CPT | Performed by: EMERGENCY MEDICINE

## 2023-07-15 PROCEDURE — 80061 LIPID PANEL: CPT | Performed by: STUDENT IN AN ORGANIZED HEALTH CARE EDUCATION/TRAINING PROGRAM

## 2023-07-15 PROCEDURE — 80053 COMPREHEN METABOLIC PANEL: CPT | Performed by: STUDENT IN AN ORGANIZED HEALTH CARE EDUCATION/TRAINING PROGRAM

## 2023-07-15 PROCEDURE — 83036 HEMOGLOBIN GLYCOSYLATED A1C: CPT | Performed by: STUDENT IN AN ORGANIZED HEALTH CARE EDUCATION/TRAINING PROGRAM

## 2023-07-15 PROCEDURE — 82607 VITAMIN B-12: CPT | Performed by: STUDENT IN AN ORGANIZED HEALTH CARE EDUCATION/TRAINING PROGRAM

## 2023-07-15 PROCEDURE — 99214 OFFICE O/P EST MOD 30 MIN: CPT | Performed by: PSYCHIATRY & NEUROLOGY

## 2023-07-15 PROCEDURE — 92523 SPEECH SOUND LANG COMPREHEN: CPT

## 2023-07-15 PROCEDURE — 93306 TTE W/DOPPLER COMPLETE: CPT | Performed by: INTERNAL MEDICINE

## 2023-07-15 PROCEDURE — 63710000001 INSULIN LISPRO (HUMAN) PER 5 UNITS: Performed by: STUDENT IN AN ORGANIZED HEALTH CARE EDUCATION/TRAINING PROGRAM

## 2023-07-15 PROCEDURE — 99239 HOSP IP/OBS DSCHRG MGMT >30: CPT

## 2023-07-15 RX ORDER — CLOPIDOGREL BISULFATE 75 MG/1
75 TABLET ORAL DAILY
Qty: 30 TABLET | Refills: 0 | Status: SHIPPED | OUTPATIENT
Start: 2023-07-15

## 2023-07-15 RX ORDER — EZETIMIBE 10 MG/1
10 TABLET ORAL DAILY
Qty: 30 TABLET | Refills: 0 | Status: SHIPPED | OUTPATIENT
Start: 2023-07-15

## 2023-07-15 RX ORDER — CLOPIDOGREL BISULFATE 75 MG/1
75 TABLET ORAL DAILY
Qty: 30 TABLET | Refills: 0 | Status: CANCELLED | OUTPATIENT
Start: 2023-07-15 | End: 2023-08-14

## 2023-07-15 RX ORDER — ROSUVASTATIN CALCIUM 40 MG/1
40 TABLET, COATED ORAL NIGHTLY
Qty: 30 TABLET | Refills: 0 | Status: SHIPPED | OUTPATIENT
Start: 2023-07-15

## 2023-07-15 RX ORDER — CLOPIDOGREL BISULFATE 75 MG/1
75 TABLET ORAL DAILY
Status: DISCONTINUED | OUTPATIENT
Start: 2023-07-15 | End: 2023-07-15 | Stop reason: HOSPADM

## 2023-07-15 RX ORDER — ROSUVASTATIN CALCIUM 20 MG/1
40 TABLET, COATED ORAL NIGHTLY
Status: DISCONTINUED | OUTPATIENT
Start: 2023-07-15 | End: 2023-07-15 | Stop reason: HOSPADM

## 2023-07-15 RX ORDER — LISINOPRIL 10 MG/1
40 TABLET ORAL NIGHTLY
Status: DISCONTINUED | OUTPATIENT
Start: 2023-07-15 | End: 2023-07-15 | Stop reason: HOSPADM

## 2023-07-15 RX ORDER — LEVOTHYROXINE SODIUM 0.05 MG/1
50 TABLET ORAL DAILY
Status: DISCONTINUED | OUTPATIENT
Start: 2023-07-15 | End: 2023-07-15 | Stop reason: HOSPADM

## 2023-07-15 RX ORDER — ALLOPURINOL 300 MG/1
300 TABLET ORAL NIGHTLY
Status: DISCONTINUED | OUTPATIENT
Start: 2023-07-15 | End: 2023-07-15 | Stop reason: HOSPADM

## 2023-07-15 RX ORDER — OMEGA-3S/DHA/EPA/FISH OIL/D3 300MG-1000
1000 CAPSULE ORAL DAILY
Status: DISCONTINUED | OUTPATIENT
Start: 2023-07-15 | End: 2023-07-15 | Stop reason: HOSPADM

## 2023-07-15 RX ORDER — ASPIRIN 81 MG/1
81 TABLET ORAL DAILY
Qty: 20 TABLET | Refills: 0 | Status: SHIPPED | OUTPATIENT
Start: 2023-07-15 | End: 2023-08-04

## 2023-07-15 RX ADMIN — LEVOTHYROXINE SODIUM 50 MCG: 50 TABLET ORAL at 10:16

## 2023-07-15 RX ADMIN — ASPIRIN 325 MG: 325 TABLET, COATED ORAL at 08:57

## 2023-07-15 RX ADMIN — Medication 1000 UNITS: at 10:16

## 2023-07-15 RX ADMIN — INSULIN LISPRO 2 UNITS: 100 INJECTION, SOLUTION INTRAVENOUS; SUBCUTANEOUS at 12:14

## 2023-07-15 RX ADMIN — Medication 10 ML: at 08:57

## 2023-07-15 RX ADMIN — CLOPIDOGREL BISULFATE 75 MG: 75 TABLET, FILM COATED ORAL at 12:14

## 2023-07-15 NOTE — THERAPY DISCHARGE NOTE
Acute Care - Physical Therapy Initial Evaluation/Discharge   Jason     Patient Name: Raven Ramirez  : 1930  MRN: [id number]  Today's Date: 7/15/2023   Onset of Illness/Injury or Date of Surgery: 23     Referring Physician: Luis      Admit Date: 2023    Visit Dx:    ICD-10-CM ICD-9-CM   1. Dysarthria  R47.1 784.51     Patient Active Problem List   Diagnosis    Closed fracture of proximal end of right humerus with routine healing    Dysarthria     Past Medical History:   Diagnosis Date    Diabetes mellitus     Disease of thyroid gland     Hyperlipidemia     Proximal humerus fracture      Past Surgical History:   Procedure Laterality Date    APPENDECTOMY      BREAST BIOPSY Left     benign    CARDIAC SURGERY      GALLBLADDER SURGERY      HYSTERECTOMY      HYSTERECTOMY         PT Assessment (last 12 hours)       PT Evaluation and Treatment       Row Name 07/15/23 1116          Physical Therapy Time and Intention    Subjective Information no complaints  -CT     Document Type evaluation;discharge evaluation/summary  -CT     Mode of Treatment physical therapy  -CT     Patient Effort good  -CT     Comment Pt reports she lives at home alone and is independent with all mobility. Pt is supervision at time of eval.  -CT       Row Name 07/15/23 1116          General Information    Patient Profile Reviewed yes  -CT     Onset of Illness/Injury or Date of Surgery 23  -CT     Referring Physician Luis  -CT     Patient Observations alert;cooperative;agree to therapy  -CT     Prior Level of Function independent:  -CT     Equipment Currently Used at Home none  -CT     Existing Precautions/Restrictions fall  -CT     Equipment Issued to Patient gait belt  -CT     Risks Reviewed patient:  -CT     Benefits Reviewed patient:  -CT     Barriers to Rehab none identified  -CT       Row Name 07/15/23 1116          Living Environment    Current Living Arrangements home  -CT     People in Home alone  -CT       Row  Name 07/15/23 1116          Home Use of Assistive/Adaptive Equipment    Equipment Currently Used at Home none  -CT       Row Name 07/15/23 1116          Cognition    Affect/Mental Status (Cognition) WFL  -CT     Orientation Status (Cognition) oriented x 4  -CT     Follows Commands (Cognition) WFL  -CT       Row Name 07/15/23 1116          Range of Motion Comprehensive    Comment, General Range of Motion BLE grossly WFL  -CT       Row Name 07/15/23 1116          Strength Comprehensive (MMT)    Comment, General Manual Muscle Testing (MMT) Assessment BLE grossly 4+/5  -CT       Row Name 07/15/23 1116          Sensory Assessment (Somatosensory)    Sensory Assessment (Somatosensory) sensation intact  -CT       Row Name 07/15/23 1116          Bed Mobility    Bed Mobility bed mobility (all) activities  -CT     All Activities, [place] (Bed Mobility) independent  -CT       Row Name 07/15/23 1116          Transfers    Transfers sit-stand transfer;stand-sit transfer  -CT       Row Name 07/15/23 1116          Sit-Stand Transfer    Sit-Stand [place] (Transfers) independent  -CT       Row Name 07/15/23 1116          Stand-Sit Transfer    Stand-Sit [place] (Transfers) independent  -CT       Row Name 07/15/23 1116          Gait/Stairs (Locomotion)    [place] Level (Gait) supervision  -CT     Assistive Device (Gait) --  none  -CT     Distance in Feet (Gait) 200  -CT     Pattern (Gait) swing-through  -CT     Deviations/Abnormal Patterns (Gait) gait speed decreased  -CT     Comment, (Gait/Stairs) one LOB episode with quick recovery  -CT       Row Name 07/15/23 1116          Balance    Balance Assessment sit to stand dynamic balance;standing static balance;sitting static balance;sitting dynamic balance;standing dynamic balance  -CT     Static Sitting Balance independent  -CT     Dynamic Sitting Balance independent  -CT     Position, Sitting Balance sitting edge of bed  -CT     Sit to Stand Dynamic Balance  independent  -CT     Static Standing Balance independent  -CT     Dynamic Standing Balance supervision  -CT       Row Name 07/15/23 1116          Coping    Observed Emotional State calm;cooperative;pleasant  -CT     Verbalized Emotional State acceptance  -CT       Row Name 07/15/23 1116          Plan of Care Review    Plan of Care Reviewed With patient;family  -CT       Row Name 07/15/23 1116          Positioning and Restraints    Pre-Treatment Position in bed  -CT     Post Treatment Position bed  -CT     In Bed supine;call light within reach;encouraged to call for assist;with family/caregiver  -CT       Row Name 07/15/23 1116          Therapy Assessment/Plan (PT)    Criteria for Skilled Interventions Met (PT) no;no problems identified which require skilled intervention  -CT     Therapy Frequency (PT) evaluation only  -CT       Row Name 07/15/23 1116          Therapy Plan Review/Discharge Plan (PT)    Therapy Plan Review (PT) evaluation/treatment results reviewed;care plan/treatment goals reviewed;risks/benefits reviewed;current/potential barriers reviewed;participants voiced agreement with care plan;participants included;patient;son  -CT               User Key  (r) = Recorded By, (t) = Taken By, (c) = Cosigned By      Initials Name Provider Type    CT Kimmie Timmons, KANDICE Physical Therapist                          PT Recommendation and Plan  Therapy Frequency (PT): evaluation only  Plan of Care Reviewed With: patient, family         Time Calculation:    PT Charges       Row Name 07/15/23 1122             Time Calculation    PT Received On 07/15/23  -CT                User Key  (r) = Recorded By, (t) = Taken By, (c) = Cosigned By      Initials Name Provider Type    CT Kimmie Timmons PT Physical Therapist                  Therapy Charges for Today       Code Description Service Date Service Provider Modifiers Qty    [id number]  PT EVAL MOD COMPLEXITY 4 7/15/2023 Kimmie Timmons, KANDICE GP 1            PT  G-Codes  AM-PAC 6 Clicks Score (PT): 21         Kimmie Timmons, PT  7/15/2023

## 2023-07-15 NOTE — DISCHARGE INSTR - APPOINTMENTS
Dr kang   office    office  closed  on  weekend  will call   on  Monday  and  get apointment  and  call  pt  with  apointment

## 2023-07-15 NOTE — PLAN OF CARE
Goal Outcome Evaluation:  Patient discharged home today. IV and telemetry D/C'd. Discharge paperwork discussed with patient. Patient has tolerated all interventions. No complaints or concerns at this time. No signs of acute distress noted. Will continue to monitor until patient off unit.

## 2023-07-15 NOTE — PROGRESS NOTES
SUBJECTIVE  Patient admits to Speech difficulty    OBJECTIVE  CTA Left ICA moderate/severe stenosis  Exam: alert and oriented x3  CN 2-12 intact  strength 5/5      ASSESSMENT/PLAN:Stroke vs TIA possibly due to left ICA stenosis on CTA  AWAITS MRI (ordered)  Increase crestor to 40 for better stroke prevention efficacy  d/c aspirin in 21 days after starting it, continue plavix and crestor  Start zetia 10mg qday for stroke prevention as per American stroke association guidelines as outpatient with script upon discharge    OK to do ECHO as outpatient            I, Julián Hutchins MD, saw the patient for a follow up or in-patient or emergency room telememedicine face to face consult or chart review using interactive technology. The location of the patient was at [facility] . I was located at [facility] .    I have proceeded with this evaluation at the request of the referring practitioner as it is felt to be necessary and no appropriate specialist is available to perform this evaluation. [facility] hospital has reported that this is the correct patient and has obtained consent from the patient/surrogate whenever possible to perform this telemedicine evaluation(including obtaining history, performing examination and reviewing data provided by the patient an/or originating site of care provider)    I have either done chart review only or introduced myself to the patient, provided my credentials, disclosed my location, and determined that, based on review of the patient's chart and discussion with the patient's primary team, telemedicine via a HIPAA compliant, real-time, face-to-face two-way, interactive audio and video platform is an appropriate and effective means of providing the service.    The patient/surrogate has a right to refuse this evaluation as they have been explained risks including potential loss of confidentiality, benefits, alternatives, and the potential need for subsequent face-to-face  care. In this evaluation, we will be providing recommendations only.  The ultimate decision to follow or not to follow these recommendations will be left to the bedside treating/requesting practitioner.    The patient/surrogate whenevr possible has been notified that other healthcare professionals including technical person may be involved in this A/V evaluation.  All laws concerning confidentiality and patient access to medical records and copies of medical records apply to telemedicine.  The patient/surrogate has received the originating site's Health Notice of Privacy Practices.    Time spent with patient: 30 minutes in face-to-face evaluation and/or management of the patient using the dedicated telemedicine device without any interruption with the help of the registered nurse with the patient located at the [facility] and myself at a remote location.  Julián Hutchins MD, QUINN

## 2023-07-15 NOTE — DISCHARGE SUMMARY
[facility] Medicine Services  DISCHARGE SUMMARY    Date of Admission: 7/14/2023    Date of Discharge:  7/16/2023    PCP: Abdoul Goyal MD    Discharging Provider: RODGER Rogers  Attending Physician on day of DC: Blane Palacio DO    Admission Diagnosis:   Please see admission H&P    Discharge Diagnosis:   Acute ischemic stroke of the left parietal lobe  CAD status post stenting  Essential hypertension, controlled  Hypothyroidism  Non-insulin dependent type II DM, controlled  Advanced age    Procedures Performed:  Chest x-ray  CT of the head without contrast  CT angiogram of the head with LVO analysis  CT angiogram of the neck  CT cerebral perfusion with and without contrast  MRI of the brain without contrast  Transthoracic echocardiogram     Consults:   Consults       No orders found from 6/15/2023 to 7/15/2023.            HPI     History of Present Illness:  Raven Ramirez is a 89 y.o. female admitted on 7/14/2023 due to new onset of intermittent expressive aphasia concerning for acute ischemic stroke. She has a pMH significant for  non-insulin dependent type 2 diabetes mellitus, essential hypertension, hypothyroidism, hyperlipidemia, CAD s/p stenting, and advanced age. For further and complete admitting information, please see admission H&P.    Hospital Course     Hospital Course  Raven Ramirez was admitted as outlined in above HPI.     She was admitted to the Telemetry floor for further monitoring, evaluation, and treatment. She had presented with chief complaint of intermittent expressive aphasia which initially began approximately 2 days prior to arrival. CODE STROKE was activated in the Emergency Department. CT of the head without contrast was negative for bleed. She was outside the window for tPA consideration. CT angiogram of head and neck noted moderate to severe stenosis of the left ICA. CT cerebral perfusion scan without any convincing evidence of focal  perfusion defect. Neurology recommended MRI of the brain. Revealed small area of restricted diffusion in the left parietal lobe in the region of the postcentral gyrus. Likely representing a small peripheral cortical infarct. Transthoracic echocardiogram was obtained.  Revealed LVEF 61 to 65% with grade 1 diastolic dysfunction. Saline test results negative for right to left atrial level shunt.  Prior to admission, patient has been taking high-dose aspirin daily for approximately 15 years since cardiac stenting.  She had already taken 325 mg aspirin on the morning of presentation.  Neurology recommended aspirin 81 mg daily, Plavix 75 mg daily, and high intensity statin.  She had already been taking rosuvastatin, however dose was increased.  Patient will continue DAPT for 21 days, then transition to monotherapy with Plavix. Patient underwent PT/OT/SLP evaluations per protocol.  Patient was found to be at baseline functional status. She had no difficulty swallowing, and was cleared for regular diet with thin liquids.  Hemoglobin A1c 7.30%.  Lipid profile revealed triglycerides of 234, otherwise unremarkable.  Zetia added daily for management of hypertriglyceridemia.  TSH was very mildly elevated at 4.910.  However, patient asymptomatic. Due to age, elected not to increase dose of levothyroxine.  May continue current dose of 50 mcg daily.  Vitamin B12 775.  Folate greater than 20.  High-sensitivity troponin T indeterminately elevated at 11.  Patient denied any chest pain.  EKG was without evidence of acute ischemia.  Patient currently not on oral antidiabetic agents.  Her metformin was recently discontinued due to improvement in hemoglobin A1c.  Blood glucose levels remained stable throughout hospitalization.  Recommend to continue follow-up with PCP for management of diabetes. Patient's symptoms significantly improved and mostly resolved on 7/15/2023.  She denies any acute complaints such as chest pain, palpitations,  dizziness, headache, visual disturbance, peripheral numbness, and weakness.  Feel that patient has currently met maximum benefit of inpatient hospitalization and may be safely discharged home on this date.  She will need to follow-up in Neurology clinic with RODGER Martin within 1 week. Recommend referral to Neurosurgery for evaluation and management of left ICA stenosis. Home health referral made for speech therapy. Discussed plans with patient and family members at bedside.  They voiced agreement and understanding with no further questions or concerns at this time.  Discussed with attending physician, Blane Palacio DO.    Discussed with AM GERONIMO Casarez on day of discharge.     Telemetry on day of discharge was normal sinus rhythm 70s.      Oxygen saturation on the day of discharge was 96% on room air.      Pertinent Laboratory and Radiology Results     Pertinent Test Results:          Results from last 7 days   Lab Units 07/15/23  0135 07/14/23  1235 07/14/23  1233   WBC 10*3/mm3  --   --  8.28   HEMOGLOBIN g/dL  --   --  12.6   HEMATOCRIT %  --   --  37.6   PLATELETS 10*3/mm3  --   --  142   MCV fL  --   --  94.2   SODIUM mmol/L 140  --  139   POTASSIUM mmol/L 3.6  --  4.3   CHLORIDE mmol/L 106  --  104   CO2 mmol/L 22.1  --  21.9*   BUN mg/dL 13  --  17   CREATININE mg/dL 1.04* 1.10 1.17*   GLUCOSE mg/dL 181*  --  282*   CALCIUM mg/dL 9.4  --  10.1*        Results from last 7 days   Lab Units 07/14/23  1233   HSTROP T ng/L 11*     Results from last 7 days   Lab Units 07/14/23  1233   WBC 10*3/mm3 8.28     Results from last 7 days   Lab Units 07/15/23  0135 07/14/23  1233   BILIRUBIN mg/dL 0.2 0.3   ALK PHOS U/L 61 76   ALT (SGPT) U/L 15 17   AST (SGOT) U/L 21 26   PROTIME Seconds  --  12.6   INR   --  0.89*   APTT seconds  --  26.5     Results from last 7 days   Lab Units 07/15/23  0135   CHOLESTEROL mg/dL 128   TRIGLYCERIDES mg/dL 234*   HDL CHOL mg/dL 53     Results from last 7 days   Lab Units  07/15/23  0135 07/14/23  1233   TSH uIU/mL  --  4.910*   HEMOGLOBIN A1C % 7.30*  --      Brief Urine Lab Results  (Last result in the past 365 days)        Color   Clarity   Blood   Leuk Est   Nitrite   Protein   CREAT   Urine HCG        07/15/23 0110 Yellow   Clear   Negative   Negative   Negative   Negative                           Results for orders placed during the hospital encounter of 07/14/23    Adult Transthoracic Echo Complete W/ Cont if Necessary Per Protocol (With Agitated Saline)    Interpretation Summary    Left ventricular ejection fraction appears to be 61 - 65%.    Left ventricular diastolic function is consistent with (grade I) impaired relaxation.    Saline test results are negative for right to left atrial level shunt.    There is calcification of the aortic valve.              ----------------------------------------------------------------------------------------------------------------------  CT Angiogram Neck    Result Date: 7/14/2023    Moderate to severe stenosis in the left internal carotid artery.  This report was finalized on 7/14/2023 1:06 PM by Dr. Sachin Renee MD.      MRI Brain Without Contrast    Result Date: 7/15/2023   POSSIBLE SMALL AREA OF RESTRICTED DIFFUSION IN THE LEFT PARIETAL LOBE IN THE REGION OF THE POSTCENTRAL GYRUS.  MAY REPRESENT A SMALL PERIPHERAL CORTICAL INFARCT.  CORRELATE WITH CLINICAL SYMPTOMS.  This report was finalized on 7/15/2023 9:59 AM by Ida Mccrary MD.      XR Chest 1 View    Result Date: 7/14/2023  No radiographic evidence of acute cardiac or pulmonary disease.  This report was finalized on 7/14/2023 1:16 PM by Dr. Sachin Renee MD.      CT Head Without Contrast Stroke Protocol    Result Date: 7/14/2023    1. No evidence of an acute ischemic event 2. No parenchymal mass, hemorrhage, or midline shift 3. Results were relayed to the emergency department at 12:35 PM  This report was finalized on 7/14/2023 12:36 PM by Dr. Sachin Renee MD.      CT  Angiogram Head w AI Analysis of LVO    Result Date: 7/14/2023    No acute findings in the arteries of the head/brain.  This report was finalized on 7/14/2023 1:07 PM by Dr. Sachin Renee MD.      CT CEREBRAL PERFUSION WITH & WITHOUT CONTRAST    Result Date: 7/14/2023   IMPRESSION: No convincing evidence of focal perfusion defect on today's exam.  This report was finalized on 7/14/2023 1:02 PM by Dr. Sachin Renee MD.         Microbiology Results (last 10 days)       Procedure Component Value - Date/Time    COVID PRE-OP / PRE-PROCEDURE SCREENING ORDER (NO ISOLATION) - Swab, Nasopharynx [[id number]]  (Normal) Collected: 07/14/23 1328    Lab Status: Final result Specimen: Swab from Nasopharynx Updated: 07/14/23 1353    Narrative:      The following orders were created for panel order COVID PRE-OP / PRE-PROCEDURE SCREENING ORDER (NO ISOLATION) - Swab, Nasopharynx.  Procedure                               Abnormality         Status                     ---------                               -----------         ------                     COVID-19 and FLU A/B PCR...[[id number]]  Normal              Final result                 Please view results for these tests on the individual orders.    COVID-19 and FLU A/B PCR - Swab, Nasopharynx [[id number]]  (Normal) Collected: 07/14/23 1328    Lab Status: Final result Specimen: Swab from Nasopharynx Updated: 07/14/23 1353     COVID19 Not Detected     Influenza A PCR Not Detected     Influenza B PCR Not Detected    Narrative:      Fact sheet for providers: https://www.fda.gov/media/291436/download    Fact sheet for patients: https://www.fda.gov/media/690405/download    Test performed by PCR.            Labs above have been reviewed on the day of discharge.  Radiology images from prior 30 days were reviewed prior to discharge as incorporated into this document.     Discharge Vitals and Physical Examination       Vital Signs        PHYSICAL EXAMINATION:   Physical Exam  Vitals and  nursing note reviewed.   Constitutional:       General: She is awake. She is not in acute distress.     Appearance: She is not ill-appearing or diaphoretic.      Comments: Room air.   HENT:      Head: Normocephalic and atraumatic.      Mouth/Throat:      Mouth: Mucous membranes are moist.      Pharynx: Oropharynx is clear.   Eyes:      Extraocular Movements: Extraocular movements intact.      Pupils: Pupils are equal, round, and reactive to light.   Cardiovascular:      Rate and Rhythm: Normal rate and regular rhythm.      Pulses: Normal pulses.      Heart sounds: Normal heart sounds. No murmur heard.    No friction rub.   Pulmonary:      Effort: Pulmonary effort is normal. No accessory muscle usage, respiratory distress or retractions.      Breath sounds: Normal breath sounds. No wheezing, rhonchi or rales.   Abdominal:      General: Bowel sounds are normal. There is no distension.      Palpations: Abdomen is soft.      Tenderness: There is no abdominal tenderness. There is no guarding.   Musculoskeletal:      Cervical back: Neck supple. No rigidity.      Right lower leg: No edema.      Left lower leg: No edema.   Skin:     General: Skin is warm and dry.      Capillary Refill: Capillary refill takes 2 to 3 seconds.   Neurological:      General: No focal deficit present.      Mental Status: She is alert and oriented to person, place, and time. Mental status is at baseline.      Cranial Nerves: No dysarthria or facial asymmetry.      Sensory: No sensory deficit.      Motor: No weakness, tremor or seizure activity.   Psychiatric:         Attention and Perception: Attention normal.         Mood and Affect: Mood normal.         Speech: Speech normal.         Behavior: Behavior normal. Behavior is cooperative.         Thought Content: Thought content normal.         Cognition and Memory: Cognition and memory normal.         Judgment: Judgment normal.         Discharge Disposition, Discharge Medications, and Discharge  Appointments     Discharge Disposition:   Home with home health for speech therapy    Condition on Discharge:  Stable    Discharge Medications:     Discharge Medications        New Medications        Instructions Start Date   clopidogrel 75 MG tablet  Commonly known as: PLAVIX   75 mg, Oral, Daily      ezetimibe 10 MG tablet  Commonly known as: Zetia   10 mg, Oral, Daily             Changes to Medications        Instructions Start Date   aspirin 81 MG EC tablet  What changed:   medication strength  how much to take   81 mg, Oral, Daily      rosuvastatin 40 MG tablet  Commonly known as: CRESTOR  What changed:   medication strength  how much to take   40 mg, Oral, Nightly             Continue These Medications        Instructions Start Date   allopurinol 300 MG tablet  Commonly known as: ZYLOPRIM   300 mg, Oral, Nightly      cholecalciferol 25 MCG (1000 UT) tablet  Commonly known as: VITAMIN D3   1,000 Units, Oral, Daily      levothyroxine 50 MCG tablet  Commonly known as: SYNTHROID, LEVOTHROID   50 mcg, Oral, Daily      lisinopril 40 MG tablet  Commonly known as: PRINIVIL,ZESTRIL   40 mg, Oral, Nightly               Discharged medication regimen discussed with attending physician prior to discharge.     Discharge Diet:   regular diet, cardiac diet, and diabetic diet        Activity at Discharge:  activity as tolerated         [place] [facility] [place]up Appointments:  Your Scheduled Appointments    Dr kang   office    office  closed  on  weekend  will call   on  Monday  and  get apointment  and  call  pt  with  apointment           Additional Instructions for the Follow-ups that You Need to Schedule       Ambulatory Referral to Home Health   As directed      Face to Face Visit Date: 7/15/2023    Follow-up provider for Plan of Care?: I treated the patient in an acute care facility and will not continue treatment after discharge.    Follow-up provider: SEBASTIAN KANG [[id number]]     Reason/Clinical Findings: Expressive aphasia due to acute ischemic stroke    Describe mobility limitations that make leaving home difficult: Expressive aphasia due to acute ischemic stroke, advanced age    Nursing/Therapeutic Services Requested: Speech Therapy    SLP orders: Dysphagia therapy Voice Articulation    Frequency: 1 Week 1         Discharge Follow-up with PCP   As directed       Currently Documented PCP:    Sebastian Goyal MD    PCP Phone Number:    [phone]     Follow Up Details: 1-2 week post hospital discharge evaluation         Discharge Follow-up with Specialty: Follow up with Pawel SOARES in the Neurology Clinic; 1 Week   As directed      Specialty: Follow up with Pawel SOARES in the Neurology Clinic    Follow Up: 1 Week    Follow Up Details: Review echo results; will need referral from neuro to Neurosurgery in [place] regarding left ICA stenosis                Follow-up Information       Sebastian Goyal MD .    Specialty: Internal Medicine  Why: 1-2 week post hospital discharge evaluation  Contact information:  [address] [place] [address]  [phone]                             Additional Instructions for the Follow-ups that You Need to Schedule       Ambulatory Referral to Home Health   As directed      Face to Face Visit Date: 7/15/2023    Follow-up provider for Plan of Care?: I treated the patient in an acute care facility and will not continue treatment after discharge.    Follow-up provider: SEBASTIAN GOYAL [[id number]]    Reason/Clinical Findings: Expressive aphasia due to acute ischemic stroke    Describe mobility limitations that make leaving home difficult: Expressive aphasia due to acute ischemic stroke, advanced age    Nursing/Therapeutic Services Requested: Speech Therapy    SLP orders: Dysphagia therapy Voice Articulation    Frequency: 1 Week 1         Discharge Follow-up with PCP   As directed       Currently Documented PCP:    Sebastian Goyal MD     PCP Phone Number:    [phone]     Follow Up Details: 1-2 week post hospital discharge evaluation         Discharge Follow-up with Specialty: Follow up with Pawel SOARES in the Neurology Clinic; 1 Week   As directed      Specialty: Follow up with Pawel SOARES in the Neurology Clinic    Follow Up: 1 Week    Follow Up Details: Review echo results; will need referral from neuro to Neurosurgery in [place] regarding left ICA stenosis                    RODGER Rogers   [facility] Medicine Team  07/16/23  12:44 EDT      Time: Greater than 30 minutes spent on this discharge.  I spent 50 minutes on this discharge activity which included:  Face-to-face encounter with the patient, discussing plan with attending physician, reviewing the data in the system, coordination of the care with the nursing staff as well as consultations, documentation, and entering orders.

## 2023-07-15 NOTE — THERAPY EVALUATION
Acute Care - Speech Language Pathology Initial Evaluation  [facility]  SPEECH LANGUAGE COGNITIVE ASSESSMENT     Patient Name: Raven Ramirez  : 1930  MRN: [id number]  Today's Date: 7/15/2023  Onset of Illness/Injury or Date of Surgery: 23     Referring Physician: Luis      Admit Date: 2023     Visit Dx:    ICD-10-CM ICD-9-CM   1. Dysarthria  R47.1 784.51     Patient Active Problem List   Diagnosis    Closed fracture of proximal end of right humerus with routine healing    Dysarthria     Past Medical History:   Diagnosis Date    Diabetes mellitus     Disease of thyroid gland     Hyperlipidemia     Proximal humerus fracture      Past Surgical History:   Procedure Laterality Date    APPENDECTOMY      BREAST BIOPSY Left     benign    CARDIAC SURGERY      GALLBLADDER SURGERY      HYSTERECTOMY      HYSTERECTOMY       Raven Ramirez is seen at bedside this am on 3S to participate in a formal s/l and cognitive evaluation to assess safety/function in adls. Pt is positioned upright and centered in bed to cooperatively participate. All results and observations of this evaluation are felt to be representative of pt's current functional status. Multiple family members are present.     Ms. Ramirez presented to [facility] ED 23 w/ ongoing intermittent reported dysarthria and aphasia. She is referred per stroke protocol. She reports her symptoms are slightly improved today, and seem to wax and wane.     Social History     Socioeconomic History    Marital status:    Tobacco Use    Smoking status: Never    Smokeless tobacco: Never   Vaping Use    Vaping Use: Unknown   Substance and Sexual Activity    Alcohol use: No    Drug use: No    Sexual activity: Defer      CLINICAL HISTORY: Speech problem.     COMPARISON: None available.     TECHNIQUE: Multiplanar multisequence MR imaging of the brain was  obtained.     FINDINGS: The midline structures are normal.  Intracranial flow voids  are preserved.  Mild  "periventricular T2 flair hyperintensities are  nonspecific and likely due to chronic microvascular changes.     In the left parietal lobe, in the region of the postcentral gyrus, is an  area of T2 and T2 FLAIR hyperintensity without restricted diffusion,  likely representing a small focus of encephalomalacia.  In the gyrus  just posterior to this abnormality, seen on diffusion images 18 and 19,  is a focal area of cortical true restricted diffusion.  There is minimal  T2 FLAIR hyperintensity in this area, as well, without additional  abnormality on the other sequences.     No acute intracranial hemorrhage or extra-axial collection.  Normal  ventricular size.  No midline shift or mass-effect.     Normal globes and orbits.     IMPRESSION:     POSSIBLE SMALL AREA OF RESTRICTED DIFFUSION IN THE LEFT PARIETAL LOBE IN  THE REGION OF THE POSTCENTRAL GYRUS.  MAY REPRESENT A SMALL PERIPHERAL  CORTICAL INFARCT.  CORRELATE WITH CLINICAL SYMPTOMS.     This report was finalized on 7/15/2023 9:59 AM by Ida Mccrary MD.    Diet Orders (active) (From admission, onward)       Start     Ordered    07/14/23 1912  Diet: Cardiac Diets, Diabetic Diets; Healthy Heart (2-3 Na+); Consistent Carbohydrate; Texture: Regular Texture (IDDSI 7); Fluid Consistency: Thin (IDDSI 0)  Diet Effective Now         07/14/23 1912                  She is observed on ra w/o complications.     Receptive language skills are intact. Pt is able to id common objects and personal body parts, follow simple and multi-step directives, understand complex \"wh\" questions and participate in conversational exchange w/o difficulties.    Expressive language skills are slightly impaired. She is able to complete automatic speech tasks, confrontation naming tasks, complete complex oe sentences, respond to complex oe \"wh\" questions, repeat at word/sentence and multiple digit levels, as well as participate in complex conversational exchange. However, she is noted w/ a " trace-mild anomia and expressive aphasia at the conversation level, exacerbated by stress/targeted stimuli. She is stimulable to phonemic cues. No verbal apraxia evidenced.    Pt is independently oriented to person, place and time. Immediate, STM and LTM are wfl w/ pt recalling recent adls and providing personal information w/o delays. Thought organization, processing and problem solving are wfl w/ pt demonstrating appropriate comparing/contrasting, understanding of idiomatic language, convergent and divergent thinking skills.    Facial/oral structures are symmetrical upon observation. Oral mucosa are moist, pink and clean. Secretions are clear, thin and well controlled. OROM/REMIGIO is wfl w/o lingual deviation upon protrusion. Speech intensity, clarity and intelligibility are wfl w/o dysarthria or oral apraxia noted.    Graphic and reading skills are intact, premorbid baseline status. Pt is able to id isolated letters, simple and moderate words, as well as sentences.     Pragmatic skills are wfl w/ appropriate eye gaze patterns and visual tracking. Language is appropriate in context w/ topic initiation and maintenance independently. No left neglect evidenced. Humor response is intact w/ appropriate affect.    Impression: Per this assessment, Ms. Ramirez presents w/ a trace-mild expressive asphasia most notably at the conversation level and negatively impacting her communication at this level. Speech, language and cognitive skills are otherwise wfl.    SLP Recommendation and Plan  Recommend outpatient SLP services for expressive aphasia. Pt will participate in further dysphagia assessment. Please see full dysphagia note below for details.    D/w pt and pt's family results and recommendations w/ verbal understanding and agreement.    D/w RN and MD results and recommendations w/ verbal understanding and agreement, report pt is tentatively discharging home today.     Thank you for allowing me to participate in the care of  your patient-  Sonia Terry M.A., CCC-SLP      EDUCATION  The patient has been educated in the following areas:   Cognitive Impairment Communication Impairment.    Time Calculation:     Therapy Charges for Today       Code Description Service Date Service Provider Modifiers Qty    [id number]  ST EVAL SPEECH AND PROD W LANG  4 7/15/2023 Sonia Terry MA,CCC-SLP GN 1    [id number] HC ST EVAL ORAL PHARYNG SWALLOW 4 7/15/2023 Sonia Terry MA,CCC-SLP GN 1              Sonia Terry MA,CCC-SLP  7/15/2023   and Acute Care - Speech Language Pathology   Swallow Initial Evaluation [facility]  CLINICAL DYSPHAGIA ASSESSMENT     Patient Name: Raven Ramirez  : 1930  MRN: [id number]  Today's Date: 7/15/2023  Onset of Illness/Injury or Date of Surgery: 23     Referring Physician: Lius      Admit Date: 2023    Visit Dx:     ICD-10-CM ICD-9-CM   1. Dysarthria  R47.1 784.51     Patient Active Problem List   Diagnosis    Closed fracture of proximal end of right humerus with routine healing    Dysarthria     Past Medical History:   Diagnosis Date    Diabetes mellitus     Disease of thyroid gland     Hyperlipidemia     Proximal humerus fracture      Past Surgical History:   Procedure Laterality Date    APPENDECTOMY      BREAST BIOPSY Left     benign    CARDIAC SURGERY      GALLBLADDER SURGERY      HYSTERECTOMY      HYSTERECTOMY       Raven Ramirez is seen at bedside this am on 3S to assess safety/efficacy of swallowing fnx, determine safest/least restrictive diet tolerance.     Ms. Ramirez presented to [facility] ED 23 w/ ongoing intermittent reported dysarthria and aphasia. She is referred per stroke protocol. She denies any overt s/s aspiration or dysphagia w/ po intake.     Social History     Socioeconomic History    Marital status:    Tobacco Use    Smoking status: Never    Smokeless tobacco: Never   Vaping Use    Vaping Use: Unknown   Substance and Sexual Activity     Alcohol use: No    Drug use: No    Sexual activity: Defer      Narrative & Impression  XR CHEST 1 VW-     CLINICAL INDICATION: Acute Stroke Protocol (onset < 12 hrs)        COMPARISON: None available      TECHNIQUE: Single frontal view of the chest.     FINDINGS:      LUNGS: Lungs are adequately aerated.      HEART AND MEDIASTINUM: Heart and mediastinal contours are unremarkable        SKELETON: Bony and soft tissue structures are unremarkable.     Artifact in the midline, presumably from the patient's bra     IMPRESSION:  No radiographic evidence of acute cardiac or pulmonary disease.     This report was finalized on 7/14/2023 1:16 PM by Dr. Sachin Renee MD.    Diet Orders (active) (From admission, onward)       Start     Ordered    07/14/23 1912  Diet: Cardiac Diets, Diabetic Diets; Healthy Heart (2-3 Na+); Consistent Carbohydrate; Texture: Regular Texture (IDDSI 7); Fluid Consistency: Thin (IDDSI 0)  Diet Effective Now         07/14/23 1912                  She is observed on ra w/o complications.     Pt is positioned upright and centered in bed to accept multiple po presentations of ice chips, solid cracker, puree and thin liquids via spoon, cup and straw. She is able to self feed.     Facial/oral structures are symmetrical upon observation. Lingual protrusion reveals no deviation. Oral mucosa are moist, pink, and clean. Secretions are clear, thin, and well controlled. OROM/REMIGIO is wfl to imitate oral postures. Gag is not assessed. Volitional cough is intact w/ adequate  intensity, clear in quality, non-productive. Voice is adequate in intensity, clear in quality w/ intelligible speech.    Upon po presentations, adequate bolus anticipation and acceptance w/ good labial seal for bolus clearance via spoon bowl, cup rim stability and suction via straw. Bolus formation, manipulation and control are wfl w/ rotary mastication pattern. A-p transit is timely w/o oral residue. No overt s/s aspiration evidenced before  the swallow.     Pharyngeal swallow is timely w/ adequate hyolaryngeal elevation per palpation. No overt s/s aspiration evidenced across this assessment. No silent aspiration suspected as pt is w/o changes in vocal quality, respirations or secretions post po presentations. Pt denies odynophagia.    Impression: Per this assessment, Ms. Ramirez presents w/ wfl oropharyngeal swallow w/o s/s aspiration. No s/s indicative of silent aspiration. No odynophagia reported.      SLP Recommendation and Plan  1. Regular consistency, thin liquids.    2. Meds whole in puree/thins.   3. Upright and centered for all po intake.  4. LEILA precautions.  5. Oral care protocol.  No further formal SLP f/u for dysphagia warranted at this time.    D/w pt and pt's family results and recommendations w/ verbal agreement.    D/w RN and MD results and recommendations w/ verbal agreement.    Thank you for allowing me to participate in the care of your patient-  Sonia Terry M.A, CCC-SLP      EDUCATION  The patient has been educated in the following areas:   Dysphagia (Swallowing Impairment).      Time Calculation:     Therapy Charges for Today       Code Description Service Date Service Provider Modifiers Qty    [id number]  ST EVAL SPEECH AND PROD W LANG  4 7/15/2023 Sonia Terry MA,CCC-SLP GN 1    [id number]  ST EVAL ORAL PHARYNG SWALLOW 4 7/15/2023 Sonia Terry MA,CCC-SLP GN 1                 Sonia Terry MA,CCC-SLP  7/15/2023

## 2023-07-15 NOTE — PLAN OF CARE
Goal Outcome Evaluation:     Patient resting in bed. No complaints of chest pain or shortness of breath. Stroke scale performed this shift. Attempted multiple times to contact MRI, unsuccessful. No visible indicators of acute distress noted. Will continue to follow plan of care this shift.

## 2023-07-17 LAB
QT INTERVAL: 358 MS
QTC INTERVAL: 433 MS

## 2023-07-17 NOTE — CASE MANAGEMENT/SOCIAL WORK
Discharge Planning Assessment   Jason     Patient Name: Raven Ramirez  MRN: [id number]  Today's Date: 7/17/2023    Admit Date: 7/14/2023            Discharge Plan       Row Name 07/17/23 1024       Plan    Plan SS received consult for stroke. SS noted Physician ordered HH services for speech therapy. SS spoke with Pt's daughter, Nora [phone] who states Pt and family do not have a preference of home health agency. SS faxed HH referral to [facility] fax [phone]. [facility] per Madelin to contact Pt and family at home once insurance has approved referral.  Pt was discharged home on 07/15/23. Daughter denied any other SS needs at this time.                    WALDEMAR RomanW

## 2023-07-28 ENCOUNTER — TRANSCRIBE ORDERS (OUTPATIENT)
Dept: PHYSICAL THERAPY | Facility: CLINIC | Age: 88
End: 2023-07-28
Payer: MEDICARE

## 2023-07-28 DIAGNOSIS — I63.30 CEREBROVASCULAR ACCIDENT (CVA) DUE TO THROMBOSIS OF CEREBRAL ARTERY: Primary | ICD-10-CM

## 2023-07-31 ENCOUNTER — OFFICE VISIT (OUTPATIENT)
Dept: PHYSICAL THERAPY | Facility: CLINIC | Age: 88
End: 2023-07-31
Payer: MEDICARE

## 2023-07-31 DIAGNOSIS — R47.1 DYSARTHRIA: ICD-10-CM

## 2023-07-31 DIAGNOSIS — I63.30 CEREBROVASCULAR ACCIDENT (CVA) DUE TO THROMBOSIS OF CEREBRAL ARTERY: Primary | ICD-10-CM

## 2023-07-31 DIAGNOSIS — R47.01 EXPRESSIVE APHASIA: ICD-10-CM

## 2023-07-31 PROCEDURE — 92523 SPEECH SOUND LANG COMPREHEN: CPT | Performed by: SPEECH-LANGUAGE PATHOLOGIST

## 2023-08-07 ENCOUNTER — TREATMENT (OUTPATIENT)
Dept: PHYSICAL THERAPY | Facility: CLINIC | Age: 88
End: 2023-08-07
Payer: MEDICARE

## 2023-08-07 DIAGNOSIS — R47.01 EXPRESSIVE APHASIA: ICD-10-CM

## 2023-08-07 DIAGNOSIS — R47.1 DYSARTHRIA: ICD-10-CM

## 2023-08-07 DIAGNOSIS — I63.30 CEREBROVASCULAR ACCIDENT (CVA) DUE TO THROMBOSIS OF CEREBRAL ARTERY: Primary | ICD-10-CM

## 2023-08-07 PROCEDURE — 92507 TX SP LANG VOICE COMM INDIV: CPT | Performed by: SPEECH-LANGUAGE PATHOLOGIST

## 2023-08-07 NOTE — PROGRESS NOTES
"  [facility]  [address] [place] [address]      Outpatient Speech Language Pathology   Adult SPEECH LANGUAGE COGNITIVE and DYSARTHRIA Treatment Note      Today's Visit Information         Patient Name: Raven Ramirez      : 1930      MRN: [id number]           Visit Date: 2023          Visit Dx:  (I63.30) Cerebrovascular accident (CVA) due to thrombosis of cerebral artery    (R47.01) Expressive aphasia    (R47.1) Dysarthria       Raven Ramirez is seen this pm at [facility] for formal speech therapy. She arrives independently today. Reports she has been practicing singing, reading, talking in generalization and states that her friends/family feel that she sounds \"better\".        Therapy Goals      LONG TERM GOALS:  Patient will improve expressive language skills to allow for maximal functional communication and independence in adls.                SHORT TERM GOALS:  1. Pt will improve word finding in conversation in 90% opp w/ min cues over 3 sessions.   *pt demonstrates mild brief delay of anomia during conversations, however able to quickly self compensate. Pt names direct word targets approx 90% acc.    2. Pt will decrease presence of dysarthria w/ confrontational naming in 90% opp w/ min cues.  *trace-mild dysarthria during conversational attempts and story telling as well as paragraph reading, minimal occurrence during targeted word naming    3. Pt will verbally produce multi-syllabic words w/ 90% acc w/ min cues over 3 sessions.   *pt produces 2 syllable words 100% acc, 3 syllable words 90% acc    4. Pt will perform TELMA tasks 90% opp w/ min cues over 3 sessions.   *trace-mild dysarthria during conversational attempts and story telling as well as paragraph reading, minimal occurrence during targeted word naming    5. Pt will improve intelligibility of speech to 90% for unknown context, 90% for known context w/ min cues over 3 " sessions.   *90% intelligible for known and unknown context    6. Pt will overarticulate and hyperphonate at the phrase/sentence level 90% opp w/ min cues over 3 sessions.   *pt demonstrates mild brief delay of anomia during conversations, however able to quickly self compensate. Pt is able to decrease ROS and over-articulate to increase spoken intelligibility w/o difficulty.        D/w pt daughter via phone per patient request to update daughter on ST goals and POC. She states verbal understanding and agreement.       Assessment        Raven presents with mild impairment of expressive aphasia and dysarthria as characterized by today's evaluation and patient report. This is impacting her ability to communicate effectively with medical professionals and communication partners in all activities of daily living across all settings.        Plan      It is recommended that Raven continue speech and language therapy to allow for improved independence communicating wants and needs during ADLs per patient's plan of care.     Home program activities:   Continue reading, singing, spoken utterances        Plan of Care: Continue Speech Therapy 1-2 time(s) per week for 12 weeks.            Planned Interventions:  adult SLC interventions     Billed Treatment Time    Total Time Calculation: 40 minutes      Charged Item(s):   NA      Planned CPT Codes: Speech/Language 88607          Referring Provider:  Abdoul Goyal Md  [address] [place] [address]   NPI: [id number]          Today's Treatment Provided by:    Thank you for allowing me to participate in the care of your patient-      Jil Montes De Oca M.A.Ed., CCC-SLP, ASD        8/7/2023    Speech-Language Pathologist  [facility]  [address] [place], [address]  Office [phone] ext. [phone]   Fax [phone]       [place] License Number: [id number]  [facility] Licence Number: [id number]     Electronically Signed

## 2023-08-14 ENCOUNTER — TREATMENT (OUTPATIENT)
Dept: PHYSICAL THERAPY | Facility: CLINIC | Age: 88
End: 2023-08-14
Payer: MEDICARE

## 2023-08-14 DIAGNOSIS — R47.01 EXPRESSIVE APHASIA: ICD-10-CM

## 2023-08-14 DIAGNOSIS — I63.30 CEREBROVASCULAR ACCIDENT (CVA) DUE TO THROMBOSIS OF CEREBRAL ARTERY: Primary | ICD-10-CM

## 2023-08-14 DIAGNOSIS — R47.1 DYSARTHRIA: ICD-10-CM

## 2023-08-14 NOTE — PROGRESS NOTES
[facility] Outpatient Therapy  [address] [place] [address]      Outpatient Speech Language Pathology   Adult SPEECH LANGUAGE COGNITIVE and DYSARTHRIA Treatment Note      Today's Visit Information         Patient Name: Raven Ramirez      : 1930      MRN: [id number]           Visit Date: 2023          Visit Dx:  (I63.30) Cerebrovascular accident (CVA) due to thrombosis of cerebral artery    (R47.01) Expressive aphasia    (R47.1) Dysarthria       Raven Ramirez is seen this pm at [facility] for formal speech therapy. She arrives independently today. Reports she has been practicing singing, reading, talking in generalization and states that her friends/family feel that she sounds like herself. She reports she is able to talk to friends/family w/o difficulties.         Therapy Goals    LONG TERM GOALS:  Patient will improve expressive language skills to allow for maximal functional communication and independence in adls.                SHORT TERM GOALS:  1. Pt will improve word finding in conversation in 90% opp w/ min cues over 3 sessions.   *pt demonstrates trace, brief delay of anomia during conversations, however able to quickly self compensate. Pt names direct word targets approx 100% acc.    2. Pt will decrease presence of dysarthria w/ confrontational naming in 90% opp w/ min cues.  *trace dysarthria during conversational attempts and story telling as well as paragraph reading, minimal occurrence during targeted word naming    3. Pt will verbally produce multi-syllabic words w/ 90% acc w/ min cues over 3 sessions.   *pt produces 2 syllable words 100% acc, 3 syllable words 95% acc    4. Pt will perform TELMA tasks 90% opp w/ min cues over 3 sessions.   *trace dysarthria during conversational attempts and story telling as well as paragraph reading, minimal occurrence during targeted word naming    5. Pt will improve intelligibility of speech to 90%  "for unknown context, 90% for known context w/ min cues over 3 sessions.   *90-95% intelligible for known and unknown context, WFL for conversations. Pt states friends/family states she feels she is back to her \"normal\" baseline.     6. Pt will overarticulate and hyperphonate at the phrase/sentence level 90% opp w/ min cues over 3 sessions.   *pt demonstrates trace, brief delay of anomia during conversations, however able to quickly self compensate. Pt is able to decrease ROS and over-articulate to increase spoken intelligibility w/o difficulty.          Assessment        Raven presents with mild impairment of expressive aphasia and dysarthria as characterized by today's evaluation and patient report. This is impacting her ability to communicate effectively with medical professionals and communication partners in all activities of daily living across all settings.        Plan      It is recommended that Raven continue speech and language therapy to allow for improved independence communicating wants and needs during ADLs per patient's plan of care. D/w pt that pt nearing baseline status and is functional across all settings and contexts. Pt wishes for x1 more week ST services to ensure that her progress continues. SLP in agreement then will readdress services as warranted.     Home program activities:   Continue reading, singing, spoken utterances        Plan of Care: Continue Speech Therapy 1 time(s) per week for 12 weeks.            Planned Interventions:  adult SLC interventions       Billed Treatment Time    Total Time Calculation: 45 minutes      Charged Item(s):   NA      Planned CPT Codes: Speech/Language 12313          Referring Provider:  Abdoul Goyal Md  [address] [place] [address]   NPI: [id number]          Today's Treatment Provided by:    Thank you for allowing me to participate in the care of your patient-      Jil Montes De Oca M.A.Ed., CCC-SLP, ASDCS        " 8/14/2023    Speech-Language Pathologist  [address] [place], [place], [address]  Office [phone] ext. [phone]   Fax [phone]       [place] License Number: [id number]  [facility] Licence Number: [id number]     Electronically Signed

## 2023-08-21 ENCOUNTER — TREATMENT (OUTPATIENT)
Dept: PHYSICAL THERAPY | Facility: CLINIC | Age: 88
End: 2023-08-21
Payer: MEDICARE

## 2023-08-21 DIAGNOSIS — I63.30 CEREBROVASCULAR ACCIDENT (CVA) DUE TO THROMBOSIS OF CEREBRAL ARTERY: Primary | ICD-10-CM

## 2023-08-21 DIAGNOSIS — R47.1 DYSARTHRIA: ICD-10-CM

## 2023-08-21 DIAGNOSIS — R47.01 EXPRESSIVE APHASIA: ICD-10-CM

## 2023-08-21 NOTE — PROGRESS NOTES
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 She arrives independently today. Reports she has been practicing singing, reading, talking in generalization and states that her friends/family feel that she sounds like herself. She reports she is able to talk to friends/family w/o difficulties. She said she only notices difficulties when she over-thinks about her speech.          Therapy Goals    LONG TERM GOALS:  Patient will improve expressive language skills to allow for maximal functional communication and independence in adls.                SHORT TERM GOALS:  1. Pt will improve word finding in conversation in 90% opp w/ min cues over 3 sessions.   *pt demonstrates trace, brief delay of anomia during conversations, however able to quickly self compensate. Pt names direct word targets approx 100% acc. Pt produces WFL speech during unstructured conversations, however trace-brief delays when patient trying to over compensate or stresses out about her words.     2. Pt will decrease presence of dysarthria w/ confrontational naming in 90% opp w/ min cues.  *trace dysarthria during conversational attempts and story telling as well as paragraph reading, minimal occurrence during targeted word naming    3. Pt will verbally produce multi-syllabic words w/ 90% acc w/ min cues over 3 sessions.   *pt produces 2 syllable words 100% acc, 3 syllable words 100% acc    4. Pt will perform TELMA tasks  "90% opp w/ min cues over 3 sessions.   *trace dysarthria during conversational attempts and story telling as well as paragraph reading, minimal occurrence during targeted word naming; Pt produces WFL speech during unstructured conversations, however trace-brief delays when patient trying to over compensate or stresses out about her words.     5. Pt will improve intelligibility of speech to 90% for unknown context, 90% for known context w/ min cues over 3 sessions.   *90-95% intelligible for known and unknown context, WFL for conversations. Pt states friends/family states she feels she is back to her \"normal\" baseline. Pt produces WFL speech during unstructured conversations, however trace-brief delays when patient trying to over compensate or stresses out about her words.     6. Pt will overarticulate and hyperphonate at the phrase/sentence level 90% opp w/ min cues over 3 sessions.   *pt demonstrates trace, brief delay of anomia during conversations, however able to quickly self compensate. Pt is able to decrease ROS and over-articulate to increase spoken intelligibility w/o difficulty. Pt produces WFL speech during unstructured conversations, however trace-brief delays when patient trying to over compensate or stresses out about her words.          Assessment and Plan       Raven presents with minimal impairment of expressive aphasia and dysarthria as characterized by today's evaluation and patient report. She reports she feels she is independent w/ speech abilities. Plan to d/c ST at this time. Pt verbally states agreement.                  Planned Interventions:  adult SLC interventions       Billed Treatment Time    Total Time Calculation: 45 minutes      Charged Item(s):   NA      Planned CPT Codes: Speech/Language 83616          Referring Provider:  Abdoul Goyal Md  [address] [place] [address]   NPI: [id number]          Today's Treatment Provided by:    Thank you for allowing me to " participate in the care of your patient-      Jil Montes De Oca M.A.Ed., CCC-SLP, ASD        8/21/2023    Speech-Language Pathologist  [address], [address]  Office [phone] ext. [phone]   Fax [phone] [place] License Number: [id number]  [facility] Licence Number: [id number]     Electronically Signed

## 2023-09-07 ENCOUNTER — OFFICE VISIT (OUTPATIENT)
Dept: NEUROSURGERY | Facility: CLINIC | Age: 88
End: 2023-09-07
Payer: MEDICARE

## 2023-09-07 VITALS — HEART RATE: 91 BPM | WEIGHT: 147.4 LBS | OXYGEN SATURATION: 97 % | BODY MASS INDEX: 27.83 KG/M2 | HEIGHT: 61 IN

## 2023-09-07 DIAGNOSIS — I63.50 CEREBROVASCULAR ACCIDENT (CVA) DUE TO OCCLUSION OF CEREBRAL ARTERY: ICD-10-CM

## 2023-09-07 DIAGNOSIS — I66.8 OCCLUSION AND STENOSIS OF OTHER CEREBRAL ARTERIES: ICD-10-CM

## 2023-09-07 DIAGNOSIS — I65.29 STENOSIS OF CAROTID ARTERY, UNSPECIFIED LATERALITY: Primary | ICD-10-CM

## 2023-09-07 DIAGNOSIS — Z86.73 HISTORY OF RECENT STROKE: ICD-10-CM

## 2023-09-07 PROCEDURE — 99204 OFFICE O/P NEW MOD 45 MIN: CPT | Performed by: PHYSICIAN ASSISTANT

## 2023-09-07 PROCEDURE — 1159F MED LIST DOCD IN RCRD: CPT | Performed by: PHYSICIAN ASSISTANT

## 2023-09-07 PROCEDURE — 1160F RVW MEDS BY RX/DR IN RCRD: CPT | Performed by: PHYSICIAN ASSISTANT

## 2023-09-07 NOTE — PROGRESS NOTES
Patient: Raven Ramirez  : 1930  Chart #: [id number]    Date of Service: 2023    Chief Complaint   Patient presents with   • Carotid Artery Disease     TIA 7/15/2023       HPI  This is a 89-year-old female who was admitted to [facility] in July for stroke.  She was placed on aspirin and Plavix for 21 days and then stepdown to Plavix.  Her work-up revealed carotid stenosis and she presents for neurosurgical evaluation.  She has had no further events since hospitalization.    Chronic Illnesses:  Past Medical History:   Diagnosis Date   • Diabetes mellitus    • Disease of thyroid gland    • Hyperlipidemia    • Proximal humerus fracture    • Stroke          Past Surgical History:   Procedure Laterality Date   • APPENDECTOMY     • BREAST BIOPSY Left     benign   • CARDIAC SURGERY     • GALLBLADDER SURGERY     • HYSTERECTOMY     • HYSTERECTOMY         Allergies   Allergen Reactions   • Morphine And Related Rash         Current Outpatient Medications:   •  allopurinol (ZYLOPRIM) 300 MG tablet, Take 1 tablet by mouth Every Night., Disp: , Rfl:   •  cholecalciferol (VITAMIN D3) 25 MCG (1000 UT) tablet, Take 1 tablet by mouth Daily., Disp: , Rfl:   •  clopidogrel (PLAVIX) 75 MG tablet, Take 1 tablet by mouth Daily., Disp: 30 tablet, Rfl: 0  •  levothyroxine (SYNTHROID, LEVOTHROID) 50 MCG tablet, Take 1 tablet by mouth Daily., Disp: , Rfl:   •  lisinopril (PRINIVIL,ZESTRIL) 40 MG tablet, Take 1 tablet by mouth Every Night., Disp: , Rfl:   •  rosuvastatin (CRESTOR) 40 MG tablet, Take 1 tablet by mouth Every Night., Disp: 30 tablet, Rfl: 0  •  ezetimibe (Zetia) 10 MG tablet, Take 1 tablet by mouth Daily. (Patient not taking: Reported on 2023), Disp: 30 tablet, Rfl: 0    Social History     Socioeconomic History   • Marital status:    Tobacco Use   • Smoking status: Never   • Smokeless tobacco: Never   Vaping Use   • Vaping Use: Unknown   Substance and Sexual Activity   • Alcohol use: No   • Drug use: No  "  • Sexual activity: Defer       Family History   Problem Relation Age of Onset   • Rheum arthritis Sister    • Heart disease Sister    • Diabetes Sister    • Breast cancer Neg Hx        BMI is >= 25 and <30. (Overweight) The following options were offered after discussion;: referral to primary care       Social History    Tobacco Use      Smoking status: Never      Smokeless tobacco: Never       Review of Systems     Gait & Balance Assessment:  Risk assessment for falls. Fall precautions:  such as;   Using gait aids a cane, walker at the appropriate height at all times for ambulation or if necessary a wheelchair  Removing all area rugs and coffee tables to create a safe environment at home  Ensure clean, dry floors  Wearing supportive footwear and properly fitting clothing  Ensure bed/chair is appropriate height and patient's feet can touch the floor  Using a shower transfer bench  Using walk-in shower and having shower safety bars installed  Ensure proper lighting, minimize glare  Have nightlights operational and in use  Participation in an exercise program for gait training, balance training and strength  Avoid carrying laundry up and down steps  Ensure proper compliance and organization of medications to avoid errors   Avoid use of over the counter sedatives and alcohol consumption  Ensure easy access to call bell, glasses, TV control, telephone  Ensure glasses/hearing aids are in use or close by (on top of night table)     Physical examination:  Pulse 91, height 154.9 cm (61\"), weight 66.9 kg (147 lb 6.4 oz), SpO2 97 %.  HEENT- normocephalic, atraumatic, sclera clear  Lungs-normal expansion, no wheezing  Heart-regular rate and rhythm  Extremities-positive pulses, no edema    Neurologic Exam  WDWN WF  A/A/C, speech clear, attention normal, conversant, answers questions appropriately, good historian.  Cranial nerves II through XII are intact.  Motor examination does not reveal weakness in the , upper or " lower extremities.   Sensation is intact.  Gait is normal, balance is normal.   No tremors are noted.    Radiographic Imaging:  For my review is an MRI of the brain from 7/15/2023 as well as CT head and neck and CT perfusion.  I have personally reviewed all the studies.    Medical Decision Making  Diagnoses and all orders for this visit:    1. Stenosis of carotid artery, unspecified laterality (Primary)  -     US Carotid Bilateral; Future    2. Occlusion and stenosis of other cerebral arteries  -     US Carotid Bilateral; Future    3. History of recent stroke    4. Cerebrovascular accident (CVA) due to occlusion of cerebral artery       This is a 89-year-old female who had a left parietal cortical infarct in July 2023.  She presented to the [facility] emergency room with mild expressive aphasia which resolved without TNK, she was outside the window of treatment..  She has been placed on Plavix.  Her work-up revealed carotid stenosis.  I will order carotid duplex exams    Any copied data from previous notes included in the (1) HPI, (2) PE, (3) MDM and/or assessment and plan has been reviewed and is accurate as of this day.    Including assessment, review of prior documentation, review and interpretation of new diagnostic studies, discussing these findings with the patient and documentation, 30 minutes total time was spent on this appointment.    Aura Hendricks, MARCE    Patient Care Team:  Abdoul Goyal MD as PCP - General (Internal Medicine)

## 2023-09-14 ENCOUNTER — HOSPITAL ENCOUNTER (OUTPATIENT)
Dept: CARDIOLOGY | Facility: HOSPITAL | Age: 88
Discharge: HOME | End: 2023-09-14
Admitting: PHYSICIAN ASSISTANT
Payer: MEDICARE

## 2023-09-14 DIAGNOSIS — I66.8 OCCLUSION AND STENOSIS OF OTHER CEREBRAL ARTERIES: ICD-10-CM

## 2023-09-14 DIAGNOSIS — I65.29 STENOSIS OF CAROTID ARTERY, UNSPECIFIED LATERALITY: ICD-10-CM

## 2023-09-14 PROCEDURE — 93880 EXTRACRANIAL BILAT STUDY: CPT

## 2023-09-21 PROBLEM — I63.9 STROKE: Status: ACTIVE | Noted: 2023-09-21

## 2023-09-21 PROBLEM — Z86.73 HISTORY OF RECENT STROKE: Status: ACTIVE | Noted: 2023-09-21

## 2023-09-21 PROBLEM — I65.23 CAROTID STENOSIS, ASYMPTOMATIC, BILATERAL: Status: ACTIVE | Noted: 2023-09-21

## 2023-09-22 ENCOUNTER — TELEPHONE (OUTPATIENT)
Dept: NEUROSURGERY | Facility: CLINIC | Age: 88
End: 2023-09-22
Payer: MEDICARE

## 2023-09-22 DIAGNOSIS — I63.50 CEREBROVASCULAR ACCIDENT (CVA) DUE TO OCCLUSION OF CEREBRAL ARTERY: ICD-10-CM

## 2023-09-22 DIAGNOSIS — I79.8 OTHER DISORDERS OF ARTERIES, ARTERIOLES AND CAPILLARIES IN DISEASES CLASSIFIED ELSEWHERE: ICD-10-CM

## 2023-09-22 DIAGNOSIS — I65.29 STENOSIS OF CAROTID ARTERY, UNSPECIFIED LATERALITY: Primary | ICD-10-CM

## 2023-09-22 NOTE — TELEPHONE ENCOUNTER
Raven Ramirez  [birth year]    This 89 yo with recent small left parietal stroke, July '23, continues on Plavix. Her carotid duplex from  9/14/23reveals velocities of 204 and ratio of 3.0 in the left carotid. CTA from 7/14/23 revealed mod to severe stenosis of the Left ICA. Brain MRI shows left parietal stroke.    Would continue Plavix therapy, good B/P control, fall precautions and f/u doppler in 6 months.     I have discussed with information with the daughter and she is in agreement with this plan.     Doppler and f/u appointment will be scheduled for 6 months.    Keisha Hendricks PA-C

## 2024-03-25 ENCOUNTER — HOSPITAL ENCOUNTER (OUTPATIENT)
Facility: HOSPITAL | Age: 89
Discharge: HOME | End: 2024-03-25
Admitting: PHYSICIAN ASSISTANT
Payer: MEDICARE

## 2024-03-25 DIAGNOSIS — I79.8 OTHER DISORDERS OF ARTERIES, ARTERIOLES AND CAPILLARIES IN DISEASES CLASSIFIED ELSEWHERE: ICD-10-CM

## 2024-03-25 DIAGNOSIS — I65.29 STENOSIS OF CAROTID ARTERY, UNSPECIFIED LATERALITY: ICD-10-CM

## 2024-03-25 DIAGNOSIS — I63.50 CEREBROVASCULAR ACCIDENT (CVA) DUE TO OCCLUSION OF CEREBRAL ARTERY: ICD-10-CM

## 2024-03-25 PROCEDURE — 93880 EXTRACRANIAL BILAT STUDY: CPT | Performed by: RADIOLOGY

## 2024-03-25 PROCEDURE — 93880 EXTRACRANIAL BILAT STUDY: CPT

## 2024-05-02 ENCOUNTER — OFFICE VISIT (OUTPATIENT)
Dept: NEUROSURGERY | Facility: CLINIC | Age: 89
End: 2024-05-02
Payer: MEDICARE

## 2024-05-02 VITALS
TEMPERATURE: 97.5 F | HEIGHT: 61 IN | DIASTOLIC BLOOD PRESSURE: 54 MMHG | BODY MASS INDEX: 27.3 KG/M2 | WEIGHT: 144.6 LBS | SYSTOLIC BLOOD PRESSURE: 140 MMHG

## 2024-05-02 DIAGNOSIS — I65.22 STENOSIS OF LEFT CAROTID ARTERY: Primary | ICD-10-CM

## 2024-05-02 RX ORDER — METFORMIN HYDROCHLORIDE 500 MG/1
TABLET, EXTENDED RELEASE ORAL
COMMUNITY
Start: 2024-04-16

## 2024-05-02 RX ORDER — LEVOTHYROXINE SODIUM 0.07 MG/1
TABLET ORAL
COMMUNITY
Start: 2024-04-11

## 2024-05-02 NOTE — PROGRESS NOTES
Patient: Raven Ramirez  : 1930    Primary Care Provider: Abdoul Goyal MD    Requesting Provider: As above      Chief Complaint: Carotid Artery Disease      History of Present Illness: This is a 89 y.o. female who presents for evaluation of left ICA stenosis.  The patient had events last summer where she had episodes of speech issues which prompted her to the emergency room.  Imaging showed a very small left hemispheric infarct.  Her speech improved.  She underwent imaging of her neck which revealed stenosis of the left ICA.  She was placed on a statin and Plavix at that time.  Since this event last July, she has not had any further stroke or TIA-like symptoms.  She comes in today with new carotid Dopplers.  She is feeling well and is completely independent.    PMHX  Allergies:  Allergies   Allergen Reactions    Morphine And Related Rash     Medications    Current Outpatient Medications:     allopurinol (ZYLOPRIM) 300 MG tablet, Take 1 tablet by mouth Every Night., Disp: , Rfl:     cholecalciferol (VITAMIN D3) 25 MCG (1000 UT) tablet, Take 1 tablet by mouth Daily., Disp: , Rfl:     clopidogrel (PLAVIX) 75 MG tablet, Take 1 tablet by mouth Daily., Disp: 30 tablet, Rfl: 0    ezetimibe (Zetia) 10 MG tablet, Take 1 tablet by mouth Daily., Disp: 30 tablet, Rfl: 0    levothyroxine (SYNTHROID, LEVOTHROID) 75 MCG tablet, , Disp: , Rfl:     lisinopril (PRINIVIL,ZESTRIL) 40 MG tablet, Take 1 tablet by mouth Every Night., Disp: , Rfl:     metFORMIN ER (GLUCOPHAGE-XR) 500 MG 24 hr tablet, , Disp: , Rfl:     rosuvastatin (CRESTOR) 40 MG tablet, Take 1 tablet by mouth Every Night., Disp: 30 tablet, Rfl: 0  Past Medical History:  Past Medical History:   Diagnosis Date    Diabetes mellitus     Disease of thyroid gland     Hyperlipidemia     Proximal humerus fracture     Stroke      Past Surgical History:  Past Surgical History:   Procedure Laterality Date    APPENDECTOMY      BREAST BIOPSY Left     benign    CARDIAC  SURGERY      GALLBLADDER SURGERY      HYSTERECTOMY      HYSTERECTOMY       Social Hx:  Social History     Tobacco Use    Smoking status: Never     Passive exposure: Past    Smokeless tobacco: Never   Vaping Use    Vaping status: Never Used   Substance Use Topics    Alcohol use: No    Drug use: No     Family Hx:  Family History   Problem Relation Age of Onset    Rheum arthritis Sister     Heart disease Sister     Diabetes Sister     Breast cancer Neg Hx      Review of Systems:        Review of Systems   Constitutional:  Negative for activity change, appetite change, chills, diaphoresis, fatigue, fever and unexpected weight change.   HENT:  Negative for congestion, dental problem, drooling, ear discharge, ear pain, facial swelling, hearing loss, mouth sores, nosebleeds, postnasal drip, rhinorrhea, sinus pressure, sinus pain, sneezing, sore throat, tinnitus, trouble swallowing and voice change.    Eyes:  Negative for photophobia, pain, discharge, redness, itching and visual disturbance.   Respiratory:  Negative for apnea, cough, choking, chest tightness, shortness of breath, wheezing and stridor.    Cardiovascular:  Negative for chest pain, palpitations and leg swelling.   Gastrointestinal:  Negative for abdominal distention, abdominal pain, anal bleeding, blood in stool, constipation, diarrhea, nausea, rectal pain and vomiting.   Endocrine: Negative for cold intolerance, heat intolerance, polydipsia, polyphagia and polyuria.   Genitourinary:  Negative for decreased urine volume, difficulty urinating, dysuria, enuresis, flank pain, frequency, genital sores, hematuria and urgency.   Musculoskeletal:  Negative for arthralgias, back pain, gait problem, joint swelling, myalgias, neck pain and neck stiffness.   Skin:  Negative for color change, pallor, rash and wound.   Allergic/Immunologic: Negative for environmental allergies, food allergies and immunocompromised state.   Neurological:  Negative for dizziness, tremors,  "seizures, syncope, facial asymmetry, speech difficulty, weakness, light-headedness, numbness and headaches.   Hematological:  Negative for adenopathy. Does not bruise/bleed easily.   Psychiatric/Behavioral:  Negative for agitation, behavioral problems, confusion, decreased concentration, dysphoric mood, hallucinations, self-injury, sleep disturbance and suicidal ideas. The patient is not nervous/anxious and is not hyperactive.    All other systems reviewed and are negative.       Physical Exam:   /54 (BP Location: Left arm, Patient Position: Sitting, Cuff Size: Adult)   Temp 97.5 °F (36.4 °C) (Infrared)   Ht 154.9 cm (61\")   Wt 65.6 kg (144 lb 9.6 oz)   BMI 27.32 kg/m²   Awake, alert and oriented x 3  Speech f/c  Opens eyes spont  Pupils 3 mm rx bilaterally  Extraocular muscles intact bilaterally  Normal sensation to light touch in all 3 distributions of CN V bilaterally  Face symmetric bilaterally  Tongue midline  5/5 in all 4 ext  No pronator drift  Diagnostic Studies:  All neurological imaging studies were independently reviewed unless stated otherwise    Assessment/Plan:  This is a 89 y.o. female presenting for evaluation of left ICA stenosis.  The patient's stroke last summer was likely from her stenosis.  She has been managed medically with Plavix and statin and has not had any further episodes, but her carotid Dopplers today show slight worsening with a ratio 3.5.  I discussed with the patient and explained that given her symptomatic nature and worsening stenosis, I do think revascularization is a reasonable option even though she is 89 years old.  Given her age, I would recommend proceeding with her carotid artery stent to avoid anesthesia and surgical complications.  The patient states she would like to hold off on any type of intervention.  She is in agreement with returning in 6 months with new carotid Dopplers.  If this shows progression of her stenosis, she would proceed with a carotid stent " at that time.  I explained to the patient that she should go to the emergency room if she has any further stroke or TIA-like symptoms.    Diagnoses and all orders for this visit:    1. Stenosis of left carotid artery (Primary)      Raven Ramirez  reports that she has never smoked. She has been exposed to tobacco smoke. She has never used smokeless tobacco.      Aaron Walker MD  05/02/24  13:02 EDT

## 2024-10-24 ENCOUNTER — HOSPITAL ENCOUNTER (OUTPATIENT)
Facility: HOSPITAL | Age: 89
Discharge: HOME | End: 2024-10-24
Admitting: STUDENT IN AN ORGANIZED HEALTH CARE EDUCATION/TRAINING PROGRAM
Payer: MEDICARE

## 2024-10-24 DIAGNOSIS — I65.22 STENOSIS OF LEFT CAROTID ARTERY: ICD-10-CM

## 2024-10-24 PROCEDURE — 93880 EXTRACRANIAL BILAT STUDY: CPT

## 2024-11-07 ENCOUNTER — OFFICE VISIT (OUTPATIENT)
Dept: NEUROSURGERY | Facility: CLINIC | Age: 89
End: 2024-11-07
Payer: COMMERCIAL

## 2024-11-07 VITALS — BODY MASS INDEX: 26.13 KG/M2 | HEIGHT: 61 IN | WEIGHT: 138.4 LBS | TEMPERATURE: 97.3 F

## 2024-11-07 DIAGNOSIS — I65.22 STENOSIS OF LEFT CAROTID ARTERY: Primary | ICD-10-CM

## 2024-11-07 NOTE — PROGRESS NOTES
"NEUROSURGERY PROGRESS NOTE    Patient: Raven Ramirez  : 1930    Primary Care Provider: Abdoul Goyal MD    Chief Complaint: Symptomatic left ICA stenosis    Subjective: This is a 89-year-old female who I previous evaluated for symptomatic left ICA stenosis.  At her last visit, there was discussion of placement of a carotid artery stent, but the patient elected to manage this conservatively and comes in today for follow-up.  Overall, she has been doing well.  She has not had any further stroke or TIA-like symptoms.  She does take a Plavix daily.    Objective    Vital Signs: Temperature 97.3 °F (36.3 °C), temperature source Infrared, height 154.9 cm (61\"), weight 62.8 kg (138 lb 6.4 oz).    Physical Exam  Awake, alert and oriented x 3  Opens eyes spont  Pupils 3 mm rx bilat  Extraocular muscles intact bilaterally  Face symmetric bilaterally  Tongue midline  5/5 in all 4 ext  No pronator drift    Current Medications:   Current Outpatient Medications:     allopurinol (ZYLOPRIM) 300 MG tablet, Take 1 tablet by mouth Every Night., Disp: , Rfl:     cholecalciferol (VITAMIN D3) 25 MCG (1000 UT) tablet, Take 1 tablet by mouth Daily., Disp: , Rfl:     clopidogrel (PLAVIX) 75 MG tablet, Take 1 tablet by mouth Daily., Disp: 30 tablet, Rfl: 0    ezetimibe (Zetia) 10 MG tablet, Take 1 tablet by mouth Daily., Disp: 30 tablet, Rfl: 0    levothyroxine (SYNTHROID, LEVOTHROID) 75 MCG tablet, , Disp: , Rfl:     lisinopril (PRINIVIL,ZESTRIL) 40 MG tablet, Take 1 tablet by mouth Every Night., Disp: , Rfl:     metFORMIN ER (GLUCOPHAGE-XR) 500 MG 24 hr tablet, , Disp: , Rfl:     rosuvastatin (CRESTOR) 40 MG tablet, Take 1 tablet by mouth Every Night., Disp: 30 tablet, Rfl: 0     Laboratory Results:                              Brief Urine Lab Results       None          Microbiology Results (last 10 days)       Procedure Component Value - Date/Time    Urine Culture - , [[id number]]  (Abnormal) Collected: 10/30/24 1333    Lab " Status: Final result Updated: 11/07/24 1323            Diagnostic Imaging: I reviewed and independently interpreted the new imaging.     Assessment/Plan:  This is a 89-year-old female who I previously evaluated for symptomatic stenosis of the left internal carotid artery.  There was discussion of proceeding with a carotid artery stent, but the patient elected to avoid that and manage this conservatively.  She has been taking a Plavix daily and has not had any further stroke or TIA-like symptoms.  Her most recent carotid Doppler shows an improvement in her left ICA with only moderate stenosis.  I had a long discussion with the patient explained given that her stenosis has somewhat improved and she is not having any further symptoms, I do not feel strongly she needs carotid revascularization.  The patient states she really does not want to go forward with any stent placement even if I thought it was needed.  Given her age, I think this is reasonable.  Because she is not going to be interested in any type of carotid revascularization, we are not going scheduling further follow-up.  The patient understood that she does have a small risk of stroke, but is okay with accepting this and not worried about further follow-up appointments.  I did tell the patient if she were to experience stroke symptoms she should go to the emergency room immediately, she understood.    Diagnoses and all orders for this visit:    1. Stenosis of left carotid artery (Primary)      Raven Ramirez  reports that she has never smoked. She has been exposed to tobacco smoke. She has never used smokeless tobacco.         Aaron Walker MD  11/07/24  13:37 EST